# Patient Record
Sex: FEMALE | Race: WHITE | Employment: FULL TIME | URBAN - METROPOLITAN AREA
[De-identification: names, ages, dates, MRNs, and addresses within clinical notes are randomized per-mention and may not be internally consistent; named-entity substitution may affect disease eponyms.]

---

## 2017-01-20 ENCOUNTER — APPOINTMENT (OUTPATIENT)
Dept: CT IMAGING | Age: 42
End: 2017-01-20
Attending: PHYSICIAN ASSISTANT
Payer: COMMERCIAL

## 2017-01-20 ENCOUNTER — HOSPITAL ENCOUNTER (EMERGENCY)
Age: 42
Discharge: HOME | End: 2017-01-20
Attending: EMERGENCY MEDICINE
Payer: COMMERCIAL

## 2017-01-20 VITALS
WEIGHT: 166 LBS | OXYGEN SATURATION: 100 % | HEIGHT: 62 IN | TEMPERATURE: 98.1 F | HEART RATE: 78 BPM | RESPIRATION RATE: 18 BRPM | DIASTOLIC BLOOD PRESSURE: 78 MMHG | SYSTOLIC BLOOD PRESSURE: 113 MMHG | BODY MASS INDEX: 30.55 KG/M2

## 2017-01-20 DIAGNOSIS — R10.9 ABDOMINAL PAIN, RIGHT LATERAL: Primary | ICD-10-CM

## 2017-01-20 DIAGNOSIS — Z98.84 HISTORY OF BARIATRIC SURGERY: ICD-10-CM

## 2017-01-20 LAB
ALBUMIN SERPL BCP-MCNC: 4 G/DL (ref 3.4–5)
ALBUMIN/GLOB SERPL: 1 {RATIO} (ref 0.8–1.7)
ALP SERPL-CCNC: 62 U/L (ref 45–117)
ALT SERPL-CCNC: 39 U/L (ref 13–56)
ANION GAP BLD CALC-SCNC: 10 MMOL/L (ref 3–18)
APPEARANCE UR: CLEAR
AST SERPL W P-5'-P-CCNC: 20 U/L (ref 15–37)
BASOPHILS # BLD AUTO: 0 K/UL (ref 0–0.06)
BASOPHILS # BLD: 0 % (ref 0–2)
BILIRUB SERPL-MCNC: 0.4 MG/DL (ref 0.2–1)
BILIRUB UR QL: NEGATIVE
BUN SERPL-MCNC: 15 MG/DL (ref 7–18)
BUN/CREAT SERPL: 23 (ref 12–20)
CALCIUM SERPL-MCNC: 9.2 MG/DL (ref 8.5–10.1)
CHLORIDE SERPL-SCNC: 105 MMOL/L (ref 100–108)
CO2 SERPL-SCNC: 26 MMOL/L (ref 21–32)
COLOR UR: YELLOW
CREAT SERPL-MCNC: 0.64 MG/DL (ref 0.6–1.3)
DIFFERENTIAL METHOD BLD: ABNORMAL
EOSINOPHIL # BLD: 0.1 K/UL (ref 0–0.4)
EOSINOPHIL NFR BLD: 1 % (ref 0–5)
ERYTHROCYTE [DISTWIDTH] IN BLOOD BY AUTOMATED COUNT: 18.1 % (ref 11.6–14.5)
GLOBULIN SER CALC-MCNC: 4.2 G/DL (ref 2–4)
GLUCOSE SERPL-MCNC: 111 MG/DL (ref 74–99)
GLUCOSE UR STRIP.AUTO-MCNC: NEGATIVE MG/DL
HCT VFR BLD AUTO: 34.7 % (ref 35–45)
HGB BLD-MCNC: 10.5 G/DL (ref 12–16)
HGB UR QL STRIP: NEGATIVE
KETONES UR QL STRIP.AUTO: NEGATIVE MG/DL
LEUKOCYTE ESTERASE UR QL STRIP.AUTO: NEGATIVE
LIPASE SERPL-CCNC: 123 U/L (ref 73–393)
LYMPHOCYTES # BLD AUTO: 43 % (ref 21–52)
LYMPHOCYTES # BLD: 2.9 K/UL (ref 0.9–3.6)
MCH RBC QN AUTO: 20.6 PG (ref 24–34)
MCHC RBC AUTO-ENTMCNC: 30.3 G/DL (ref 31–37)
MCV RBC AUTO: 68.2 FL (ref 74–97)
MONOCYTES # BLD: 0.3 K/UL (ref 0.05–1.2)
MONOCYTES NFR BLD AUTO: 5 % (ref 3–10)
NEUTS SEG # BLD: 3.5 K/UL (ref 1.8–8)
NEUTS SEG NFR BLD AUTO: 51 % (ref 40–73)
NITRITE UR QL STRIP.AUTO: NEGATIVE
PH UR STRIP: 5 [PH] (ref 5–8)
PLATELET # BLD AUTO: 247 K/UL (ref 135–420)
PLATELET COMMENTS,PCOM: ABNORMAL
POTASSIUM SERPL-SCNC: 3.4 MMOL/L (ref 3.5–5.5)
PROT SERPL-MCNC: 8.2 G/DL (ref 6.4–8.2)
PROT UR STRIP-MCNC: NEGATIVE MG/DL
RBC # BLD AUTO: 5.09 M/UL (ref 4.2–5.3)
RBC MORPH BLD: ABNORMAL
SODIUM SERPL-SCNC: 141 MMOL/L (ref 136–145)
SP GR UR REFRACTOMETRY: 1.02 (ref 1–1.03)
UROBILINOGEN UR QL STRIP.AUTO: 0.2 EU/DL (ref 0.2–1)
WBC # BLD AUTO: 6.8 K/UL (ref 4.6–13.2)

## 2017-01-20 PROCEDURE — 99284 EMERGENCY DEPT VISIT MOD MDM: CPT

## 2017-01-20 PROCEDURE — 83690 ASSAY OF LIPASE: CPT | Performed by: PHYSICIAN ASSISTANT

## 2017-01-20 PROCEDURE — 96360 HYDRATION IV INFUSION INIT: CPT

## 2017-01-20 PROCEDURE — 80053 COMPREHEN METABOLIC PANEL: CPT | Performed by: PHYSICIAN ASSISTANT

## 2017-01-20 PROCEDURE — 74011636320 HC RX REV CODE- 636/320: Performed by: PHYSICIAN ASSISTANT

## 2017-01-20 PROCEDURE — 85025 COMPLETE CBC W/AUTO DIFF WBC: CPT | Performed by: PHYSICIAN ASSISTANT

## 2017-01-20 PROCEDURE — 74011250636 HC RX REV CODE- 250/636: Performed by: PHYSICIAN ASSISTANT

## 2017-01-20 PROCEDURE — 74176 CT ABD & PELVIS W/O CONTRAST: CPT

## 2017-01-20 PROCEDURE — 81003 URINALYSIS AUTO W/O SCOPE: CPT | Performed by: PHYSICIAN ASSISTANT

## 2017-01-20 RX ORDER — DICYCLOMINE HYDROCHLORIDE 20 MG/1
20 TABLET ORAL EVERY 6 HOURS
Qty: 20 TAB | Refills: 0 | Status: SHIPPED | OUTPATIENT
Start: 2017-01-20 | End: 2017-01-25

## 2017-01-20 RX ADMIN — IOHEXOL: 240 INJECTION, SOLUTION INTRATHECAL; INTRAVASCULAR; INTRAVENOUS; ORAL at 19:30

## 2017-01-20 RX ADMIN — SODIUM CHLORIDE 1000 ML: 900 INJECTION, SOLUTION INTRAVENOUS at 19:30

## 2017-01-20 NOTE — ED TRIAGE NOTES
Patient reports she has been having pain to right flank for about 5 months. My doctor told me it was a muscle. I went to my primary for setting up ultrasound. It is hurting I need to be seen for it they said it might be my gallbladder. Sepsis Screening completed    (  )Patient meets SIRS criteria. (  x)Patient does not meet SIRS criteria.       SIRS Criteria is achieved when two or more of the following are present   Temperature < 96.8°F (36°C) or > 100.9°F (38.3°C)   Heart Rate > 90 beats per minute   Respiratory Rate > 20 beats per minute   WBC count > 12,000 or <4,000 or > 10% bands

## 2017-01-21 NOTE — ED NOTES
Pt hourly rounding competed. Safety   Pt (x) resting on stretcher with side rails up and call bell in reach. () in chair    () in parents arms. Toileting   Pt offered ()Bedpan     (x)Assistance to Restroom     ()Urinal  Ongoing Updates  Updated on plan of care and status of test results.   Pain Management  Inquired as to comfort and offered comfort measures:    (x) warm blankets   () dimmed lights

## 2017-01-21 NOTE — DISCHARGE INSTRUCTIONS

## 2017-01-21 NOTE — ED NOTES
Patient armband removed and shredded I have reviewed discharge instructions with the patient. The patient verbalized understanding. 1 e-script reviewed.

## 2017-01-21 NOTE — ED PROVIDER NOTES
HPI Comments: 7:05 PM   Lawson Babcock is a 46 y.o. female presenting to the ED C/O RUQ abd pain that radiates into the lower abd, onset 5 months ago and worsening the last 2 weeks. Worsens with certain foods. Associated sxs include constipation. Pt has been seen for the same pain and was dx'ed with a muscle spasm. Pt is currently trying to get an US of her gallbladder through her PCP, but has not heard back. PSHX of gastric sleeve (done by Dr. Marie Gaitan). Pt denies PSHX appendectomy and cholecystectomy, fever, CP, SOB, n/v, and any other symptoms or complaints. Written by ARSH Newell, as dictated by York Sacks, PA-C     Patient is a 46 y.o. female presenting with abdominal pain. The history is provided by the patient. No  was used. Abdominal Pain    This is a new problem. The current episode started more than 1 week ago. The problem occurs constantly. The problem has been gradually worsening. The pain is located in the RUQ. Associated symptoms include constipation. Pertinent negatives include no fever, no nausea, no vomiting and no chest pain. Past Medical History:   Diagnosis Date    Anemia     Anxiety     GERD (gastroesophageal reflux disease)     Hypercholesterolemia     Hypothyroid     Intestinal malabsorption     Psoriasis     Psoriatic arthritis ([address]     Severe obesity (HCC)     Status post bariatric surgery 1/2016     sleeve resection / terracina       Past Surgical History:   Procedure Laterality Date    Hx orthopaedic       torn labrum- right shoulder    Hx gi  1/2016     sleeve resection / terracina         Family History:   Problem Relation Age of Onset    Diabetes Mother     Hypertension Mother     Elevated Lipids Father     Hypertension Father        Social History     Social History    Marital status:      Spouse name: N/A    Number of children: N/A    Years of education: N/A     Occupational History    Not on file. Social History Main Topics    Smoking status: Never Smoker    Smokeless tobacco: Never Used    Alcohol use No    Drug use: No    Sexual activity: Yes     Partners: Male     Other Topics Concern    Not on file     Social History Narrative         ALLERGIES: Bactrim [sulfamethoprim] and Vicodin [hydrocodone-acetaminophen]    Review of Systems   Constitutional: Negative for fever. Respiratory: Negative for shortness of breath. Cardiovascular: Negative for chest pain. Gastrointestinal: Positive for abdominal pain and constipation. Negative for nausea and vomiting. All other systems reviewed and are negative. Vitals:    01/20/17 1859   BP: 125/65   Pulse: 94   Resp: 16   Temp: 98.1 °F (36.7 °C)   SpO2: 100%   Weight: 75.3 kg (166 lb)   Height: 5' 2\" (1.575 m)            Physical Exam   Constitutional: She is oriented to person, place, and time. She appears well-developed and well-nourished. No distress. HENT:   Head: Normocephalic and atraumatic. Eyes: Conjunctivae and EOM are normal. Pupils are equal, round, and reactive to light. No scleral icterus. Neck: Normal range of motion. Neck supple. Cardiovascular: Normal rate and regular rhythm. Pulmonary/Chest: Effort normal and breath sounds normal. She has no wheezes. Abdominal: Soft. Bowel sounds are normal. She exhibits no distension. There is tenderness. There is no rebound and no guarding. Musculoskeletal: Normal range of motion. She exhibits no edema or tenderness. Neurological: She is alert and oriented to person, place, and time. Skin: Skin is warm and dry. Psychiatric: She has a normal mood and affect. Her behavior is normal.   Nursing note and vitals reviewed. RESULTS:    CT ABD PELV WO CONT   Final Result   1. There are no findings to explain the patient's abdominal pain.        2. 7 mm pulmonary nodule in the left lower lobe which has nonaggressive imaging  characteristics.  If the patient is a low risk patient, this can be followed in  6-12 months and again in 18 - 24 months. If this is a high-risk patient, this  should be followed in 3-6 months, 6-12 months and 18 and 24 months. As read by the radiologist.         Labs Reviewed   CBC WITH AUTOMATED DIFF - Abnormal; Notable for the following:        Result Value    HGB 10.5 (*)     HCT 34.7 (*)     MCV 68.2 (*)     MCH 20.6 (*)     MCHC 30.3 (*)     RDW 18.1 (*)     All other components within normal limits   METABOLIC PANEL, COMPREHENSIVE - Abnormal; Notable for the following:     Potassium 3.4 (*)     Glucose 111 (*)     BUN/Creatinine ratio 23 (*)     Globulin 4.2 (*)     All other components within normal limits   LIPASE   URINALYSIS W/ RFLX MICROSCOPIC       Recent Results (from the past 12 hour(s))   CBC WITH AUTOMATED DIFF    Collection Time: 01/20/17  7:30 PM   Result Value Ref Range    WBC 6.8 4.6 - 13.2 K/uL    RBC 5.09 4. 20 - 5.30 M/uL    HGB 10.5 (L) 12.0 - 16.0 g/dL    HCT 34.7 (L) 35.0 - 45.0 %    MCV 68.2 (L) 74.0 - 97.0 FL    MCH 20.6 (L) 24.0 - 34.0 PG    MCHC 30.3 (L) 31.0 - 37.0 g/dL    RDW 18.1 (H) 11.6 - 14.5 %    PLATELET 747 520 - 079 K/uL    NEUTROPHILS 51 40 - 73 %    LYMPHOCYTES 43 21 - 52 %    MONOCYTES 5 3 - 10 %    EOSINOPHILS 1 0 - 5 %    BASOPHILS 0 0 - 2 %    ABS. NEUTROPHILS 3.5 1.8 - 8.0 K/UL    ABS. LYMPHOCYTES 2.9 0.9 - 3.6 K/UL    ABS. MONOCYTES 0.3 0.05 - 1.2 K/UL    ABS. EOSINOPHILS 0.1 0.0 - 0.4 K/UL    ABS.  BASOPHILS 0.0 0.0 - 0.06 K/UL    PLATELET COMMENTS LARGE PLATELETS      RBC COMMENTS ANISOCYTOSIS  1+        RBC COMMENTS MICROCYTOSIS  1+        RBC COMMENTS HYPOCHROMIA  1+        RBC COMMENTS POIKILOCYTOSIS  1+        RBC COMMENTS TARGET CELLS  OCCASIONAL  OVALOCYTES  1+        RBC COMMENTS SCHISTOCYTES  OCCASIONAL        DF AUTOMATED     METABOLIC PANEL, COMPREHENSIVE    Collection Time: 01/20/17  7:30 PM   Result Value Ref Range    Sodium 141 136 - 145 mmol/L    Potassium 3.4 (L) 3.5 - 5.5 mmol/L    Chloride 105 100 - 108 mmol/L    CO2 26 21 - 32 mmol/L    Anion gap 10 3.0 - 18 mmol/L    Glucose 111 (H) 74 - 99 mg/dL    BUN 15 7.0 - 18 MG/DL    Creatinine 0.64 0.6 - 1.3 MG/DL    BUN/Creatinine ratio 23 (H) 12 - 20      GFR est AA >60 >60 ml/min/1.73m2    GFR est non-AA >60 >60 ml/min/1.73m2    Calcium 9.2 8.5 - 10.1 MG/DL    Bilirubin, total 0.4 0.2 - 1.0 MG/DL    ALT 39 13 - 56 U/L    AST 20 15 - 37 U/L    Alk. phosphatase 62 45 - 117 U/L    Protein, total 8.2 6.4 - 8.2 g/dL    Albumin 4.0 3.4 - 5.0 g/dL    Globulin 4.2 (H) 2.0 - 4.0 g/dL    A-G Ratio 1.0 0.8 - 1.7     LIPASE    Collection Time: 01/20/17  7:30 PM   Result Value Ref Range    Lipase 123 73 - 393 U/L   URINALYSIS W/ RFLX MICROSCOPIC    Collection Time: 01/20/17  7:30 PM   Result Value Ref Range    Color YELLOW      Appearance CLEAR      Specific gravity 1.016 1.005 - 1.030      pH (UA) 5.0 5.0 - 8.0      Protein NEGATIVE  NEG mg/dL    Glucose NEGATIVE  NEG mg/dL    Ketone NEGATIVE  NEG mg/dL    Bilirubin NEGATIVE  NEG      Blood NEGATIVE  NEG      Urobilinogen 0.2 0.2 - 1.0 EU/dL    Nitrites NEGATIVE  NEG      Leukocyte Esterase NEGATIVE  NEG          MDM  Number of Diagnoses or Management Options     Amount and/or Complexity of Data Reviewed  Clinical lab tests: ordered and reviewed  Tests in the radiology section of CPT®: ordered and reviewed (CT Abd Pelv)      ED Course     MEDICATIONS GIVEN:  Medications   sodium chloride 0.9 % bolus infusion 1,000 mL (1,000 mL IntraVENous New Bag 1/20/17 1930)   iohexol (OMNIPAQUE) ORAL mixture ( Oral Given 1/20/17 1930)        Procedures    Pulse oximetry on room air is 100%. PROGRESS NOTE:  7:05 PM  Initial assessment performed. Written by Jax Martin ED Scribe, as dictated by Andres Smith PA-C     DISCHARGE NOTE:  9:11 PM   Kimmy Lassiter  results have been reviewed with her. She has been counseled regarding her diagnosis, treatment, and plan.   She verbally conveys understanding and agreement of the signs, symptoms, diagnosis, treatment and prognosis and additionally agrees to follow up as discussed. She also agrees with the care-plan and conveys that all of her questions have been answered. I have also provided discharge instructions for her that include: educational information regarding their diagnosis and treatment, and list of reasons why they would want to return to the ED prior to their follow-up appointment, should her condition change. She has been provided with education for proper emergency department utilization. CLINICAL IMPRESSION:    1. Abdominal pain, right lateral    2. History of bariatric surgery        PLAN:  1. D/C Home  2. Current Discharge Medication List      START taking these medications    Details   dicyclomine (BENTYL) 20 mg tablet Take 1 Tab by mouth every six (6) hours for 20 doses. Qty: 20 Tab, Refills: 0         CONTINUE these medications which have NOT CHANGED    Details   FERROUS SULFATE, DRIED (IRON, DRIED, PO) Take  by mouth.      cyanocobalamin (VITAMIN B-12) 1,000 mcg sublingual tablet Take 1,000 mcg by mouth daily. multivitamin with iron (FLINTSTONES) chewable tablet Take 2 Tabs by mouth daily. levothyroxine (SYNTHROID) 150 mcg tablet Take  by mouth Daily (before breakfast). Indications: HYPOTHYROIDISM      docusate sodium (COLACE) 100 mg capsule Take 100 mg by mouth two (2) times a day.            3.   Follow-up Information     Follow up With Details Comments [address], [place] Schedule an appointment as soon as possible for a visit in 2 days  [address] [place] [id number] [phone]      [facility] EMERGENCY DEPT  As needed, If symptoms worsen [address] [facility] [address] [phone]          SCRIBE ATTESTATION:  This note is prepared by Charlene Barriga, acting as Scribe for Andres Smith PA-C.    PROVIDER ATTESTATION:  Andres Smith PA-C: The scribe's documentation has been prepared under my direction and personally reviewed by me in its entirety. I confirm that the note above accurately reflects all work, treatment, procedures, and medical decision making performed by me.

## 2017-02-01 ENCOUNTER — HOSPITAL ENCOUNTER (OUTPATIENT)
Dept: LAB | Age: 42
Discharge: HOME | End: 2017-02-01
Payer: COMMERCIAL

## 2017-02-01 ENCOUNTER — OFFICE VISIT (OUTPATIENT)
Dept: SURGERY | Age: 42
End: 2017-02-01

## 2017-02-01 VITALS
DIASTOLIC BLOOD PRESSURE: 77 MMHG | BODY MASS INDEX: 30.18 KG/M2 | HEIGHT: 62 IN | HEART RATE: 76 BPM | OXYGEN SATURATION: 100 % | WEIGHT: 164 LBS | RESPIRATION RATE: 16 BRPM | SYSTOLIC BLOOD PRESSURE: 131 MMHG

## 2017-02-01 DIAGNOSIS — R10.13 EPIGASTRIC PAIN: Primary | ICD-10-CM

## 2017-02-01 LAB
25(OH)D3 SERPL-MCNC: 38.9 NG/ML (ref 30–100)
ALBUMIN SERPL BCP-MCNC: 4 G/DL (ref 3.4–5)
ALBUMIN/GLOB SERPL: 1.2 {RATIO} (ref 0.8–1.7)
ALP SERPL-CCNC: 58 U/L (ref 45–117)
ALT SERPL-CCNC: 48 U/L (ref 13–56)
ANION GAP BLD CALC-SCNC: 10 MMOL/L (ref 3–18)
AST SERPL W P-5'-P-CCNC: 18 U/L (ref 15–37)
BASOPHILS # BLD AUTO: 0 K/UL (ref 0–0.06)
BASOPHILS # BLD: 1 % (ref 0–2)
BILIRUB SERPL-MCNC: 0.4 MG/DL (ref 0.2–1)
BUN SERPL-MCNC: 13 MG/DL (ref 7–18)
BUN/CREAT SERPL: 22 (ref 12–20)
CALCIUM SERPL-MCNC: 9.1 MG/DL (ref 8.5–10.1)
CHLORIDE SERPL-SCNC: 106 MMOL/L (ref 100–108)
CO2 SERPL-SCNC: 28 MMOL/L (ref 21–32)
CREAT SERPL-MCNC: 0.59 MG/DL (ref 0.6–1.3)
DIFFERENTIAL METHOD BLD: ABNORMAL
EOSINOPHIL # BLD: 0 K/UL (ref 0–0.4)
EOSINOPHIL NFR BLD: 1 % (ref 0–5)
ERYTHROCYTE [DISTWIDTH] IN BLOOD BY AUTOMATED COUNT: 18.1 % (ref 11.6–14.5)
FERRITIN SERPL-MCNC: 13 NG/ML (ref 8–388)
FOLATE SERPL-MCNC: >20 NG/ML (ref 3.1–17.5)
GLOBULIN SER CALC-MCNC: 3.3 G/DL (ref 2–4)
GLUCOSE SERPL-MCNC: 75 MG/DL (ref 74–99)
HCT VFR BLD AUTO: 32.1 % (ref 35–45)
HGB BLD-MCNC: 9.5 G/DL (ref 12–16)
IRON SERPL-MCNC: 15 UG/DL (ref 50–175)
LYMPHOCYTES # BLD AUTO: 39 % (ref 21–52)
LYMPHOCYTES # BLD: 1.6 K/UL (ref 0.9–3.6)
MCH RBC QN AUTO: 20.4 PG (ref 24–34)
MCHC RBC AUTO-ENTMCNC: 29.6 G/DL (ref 31–37)
MCV RBC AUTO: 68.9 FL (ref 74–97)
MONOCYTES # BLD: 0.3 K/UL (ref 0.05–1.2)
MONOCYTES NFR BLD AUTO: 7 % (ref 3–10)
NEUTS SEG # BLD: 2.3 K/UL (ref 1.8–8)
NEUTS SEG NFR BLD AUTO: 52 % (ref 40–73)
PLATELET # BLD AUTO: 215 K/UL (ref 135–420)
PLATELET COMMENTS,PCOM: ABNORMAL
POTASSIUM SERPL-SCNC: 4.1 MMOL/L (ref 3.5–5.5)
PROT SERPL-MCNC: 7.3 G/DL (ref 6.4–8.2)
RBC # BLD AUTO: 4.66 M/UL (ref 4.2–5.3)
RBC MORPH BLD: ABNORMAL
SODIUM SERPL-SCNC: 144 MMOL/L (ref 136–145)
VIT B12 SERPL-MCNC: 1900 PG/ML (ref 211–911)
WBC # BLD AUTO: 4.2 K/UL (ref 4.6–13.2)

## 2017-02-01 PROCEDURE — 83540 ASSAY OF IRON: CPT | Performed by: SPECIALIST

## 2017-02-01 PROCEDURE — 80053 COMPREHEN METABOLIC PANEL: CPT | Performed by: SPECIALIST

## 2017-02-01 PROCEDURE — 85025 COMPLETE CBC W/AUTO DIFF WBC: CPT | Performed by: SPECIALIST

## 2017-02-01 PROCEDURE — 36415 COLL VENOUS BLD VENIPUNCTURE: CPT | Performed by: SPECIALIST

## 2017-02-01 PROCEDURE — 82728 ASSAY OF FERRITIN: CPT | Performed by: SPECIALIST

## 2017-02-01 PROCEDURE — 82607 VITAMIN B-12: CPT | Performed by: SPECIALIST

## 2017-02-01 PROCEDURE — 84425 ASSAY OF VITAMIN B-1: CPT | Performed by: SPECIALIST

## 2017-02-01 PROCEDURE — 82306 VITAMIN D 25 HYDROXY: CPT | Performed by: SPECIALIST

## 2017-02-01 RX ORDER — ASPIRIN 325 MG
TABLET, DELAYED RELEASE (ENTERIC COATED) ORAL
COMMUNITY
End: 2017-02-28

## 2017-02-01 RX ORDER — CITALOPRAM 20 MG/1
40 TABLET, FILM COATED ORAL DAILY
COMMUNITY
End: 2020-07-29 | Stop reason: ALTCHOICE

## 2017-02-01 NOTE — PROGRESS NOTES
Subjective:     Dionicia Dakins  is a 46 y.o. female who presents for follow-up about 1 year following laparoscopic sleeve gastrectomy. She has lost a total of 108 pounds since surgery. Body mass index is 30 kg/(m^2). . EBWL is (74%). The patient presents today to assess their progress toward their goal of weight loss and to address any issues that may be present. Today the patient and I have reviewed their diet and how appropriate their food choices are. The following issues have been identified : RUQ pain with eating. Some nausea and emesis with the pain. .  Surgery related complication: none       She reports no issues and denies vomiting and abdominal pain. The patients diet choices have been reviewed today and are very good  Patients pain score:0    The patient's exercise level: very active. Changes in her medical history and medications have been reviewed. Patient Active Problem List   Diagnosis Code    Psoriasis L40.9    Hypothyroid E03.9    Anemia D64.9    Psoriatic arthritis ([address] L40.50    Anxiety F41.9    Intestinal malabsorption K90.9    Status post bariatric surgery Z98.84     Past Medical History   Diagnosis Date    Anemia     Anxiety     GERD (gastroesophageal reflux disease)     Hypercholesterolemia     Hypothyroid     Intestinal malabsorption     Psoriasis     Psoriatic arthritis ([address]     Severe obesity ([address]     Status post bariatric surgery 1/2016     sleeve resection / terracina     Past Surgical History   Procedure Laterality Date    Hx orthopaedic       torn labrum- right shoulder    Hx gi  1/2016     sleeve resection / terracina     Current Outpatient Prescriptions   Medication Sig Dispense Refill    LINACLOTIDE (LINZESS PO) Take  by mouth.  citalopram (CELEXA) 20 mg tablet Take  by mouth daily.  Cholecalciferol, Vitamin D3, 50,000 unit cap Take  by mouth.  FERROUS SULFATE, DRIED (IRON, DRIED, PO) Take  by mouth.       cyanocobalamin (VITAMIN B-12) 1,000 mcg sublingual tablet Take 1,000 mcg by mouth daily.  multivitamin with iron (FLINTSTONES) chewable tablet Take 2 Tabs by mouth daily.  levothyroxine (SYNTHROID) 150 mcg tablet Take  by mouth Daily (before breakfast). Indications: HYPOTHYROIDISM          Review of Symptoms:       General - No history or complaints of unexpected fever or chills  Head/Neck - No history or complaints of headache or dizziness  Cardiac - No history or complaints of chest pain, palpitations, or shortness of breath  Pulmonary - No history or complaints of shortness of breath or productive cough  Gastrointestinal - as noted above  Genitourinary - No history or complaints of hematuria/dysuria or renal lithiasis  Musculoskeletal - No history or complaints of joint  muscular weakness  Hematologic - No history of any bleeding episodes  Neurologic - No history or complaints of  migraine headaches or neurologic symptoms                     Objective:     Visit Vitals    /77 (BP 1 Location: Left arm, BP Patient Position: Sitting)    Pulse 76    Resp 16    Ht 5' 2\" (1.575 m)    Wt 74.4 kg (164 lb)    LMP 12/23/2016    SpO2 100%    BMI 30 kg/m2        Physical Exam:    General:  alert, cooperative, no distress, appears stated age   Lungs:   clear to auscultation bilaterally   Heart:  Regular rate and rhythm   Abdomen:   abdomen is soft without significant tenderness, masses, organomegaly or guarding;     Incisions: healing well, no hernias       Lab Results   Component Value Date/Time    WBC 6.8 01/20/2017 07:30 PM    HGB 10.5 01/20/2017 07:30 PM    HCT 34.7 01/20/2017 07:30 PM    PLATELET 222 63/24/9496 07:30 PM    MCV 68.2 01/20/2017 07:30 PM     Lab Results   Component Value Date/Time    Sodium 141 01/20/2017 07:30 PM    Potassium 3.4 01/20/2017 07:30 PM    Chloride 105 01/20/2017 07:30 PM    CO2 26 01/20/2017 07:30 PM    Anion gap 10 01/20/2017 07:30 PM    Glucose 111 01/20/2017 07:30 PM    BUN 15 01/20/2017 07:30 PM    Creatinine 0.64 01/20/2017 07:30 PM    BUN/Creatinine ratio 23 01/20/2017 07:30 PM    GFR est AA >60 01/20/2017 07:30 PM    GFR est non-AA >60 01/20/2017 07:30 PM    Calcium 9.2 01/20/2017 07:30 PM    Bilirubin, total 0.4 01/20/2017 07:30 PM    ALT 39 01/20/2017 07:30 PM    AST 20 01/20/2017 07:30 PM    Alk. phosphatase 62 01/20/2017 07:30 PM    Protein, total 8.2 01/20/2017 07:30 PM    Albumin 4.0 01/20/2017 07:30 PM    Globulin 4.2 01/20/2017 07:30 PM    A-G Ratio 1.0 01/20/2017 07:30 PM     Lab Results   Component Value Date/Time    Iron 17 07/20/2015 11:55 AM    Ferritin 19 07/20/2015 11:55 AM     No results found for: FOL, RBCF  No results found for: Kelly Bingham [id number]          CT ABD PELV WO CONT (Accession [id number]) (Order [id number])   Allergies      Unspecified: Bactrim [Sulfamethoprim]; Vicodin [Hydrocodone-acetaminophen]   Result Information   Status Provider Status      Final result (Exam End: 1/20/2017  8:34 PM) Open    Study Result   History: Abdominal pain.     COMPARISON: None.     TECHNIQUE: Multidetector CT images were acquired through the abdomen and pelvis  without intravenous contrast but with oral contrast. Coronal and sagittal  reformatted images as well as dose reduction techniques were employed to include  iterative reconstruction and automated exposure control.     FINDINGS:     There is a 7 mm pulmonary nodule in the left lower lobe which appears to be  mixed solid and subsolid with a pleural attachment. The right lung bases clear. The heart size is normal. The esophagus is unremarkable.     Postsurgical changes are noted in the left upper quadrant. The spleen, pancreas,  adrenal glands, kidneys and ureters do not demonstrate any acute abnormalities. The gallbladder and liver are unremarkable. Oral contrast can be seen passing  into the distal ileum but not into the terminal ileum. The small bowel is normal  in caliber.  The colon has a normal appearance. What is thought to be the  appendix is normal. The bladder and reproductive organs are unremarkable. There  is no intraperitoneal free air or free fluid. The soft tissues are unremarkable. I find no acute or destructive osseous abnormalities. Age-appropriate  degenerative changes are present in the spine.     IMPRESSION  IMPRESSION:        1. There are no findings to explain the patient's abdominal pain.        2. 7 mm pulmonary nodule in the left lower lobe which has nonaggressive imaging  characteristics. If the patient is a low risk patient, this can be followed in  6-12 months and again in 18 - 24 months. If this is a high-risk patient, this  should be followed in 3-6 months, 6-12 months and 18 and 24 months. Assessment:     1. History of Morbid obesity, status post  laparoscopic sleeve gastrectomy. Possible symptomatic cholelithiasis. Plan:     1. Remember to measure portions, continue low carbohydrate diet  2. Continue to concentrate on protein intake meeting daily requirements  3. Remember vitamin supplements. The importance of such was discussed regarding the malabsorptive issues that the surgery creates. 4. Exercise regimen appears to be: very good  5. Try and attend support group if feasible. 6. Follow-up in 6 month(s). 7. Lab ordered  8. Total time spent with the patient 30 minutes.   9. Schedule for ultrasound of abdomen

## 2017-02-01 NOTE — PATIENT INSTRUCTIONS
Body Mass Index: Care Instructions  Your Care Instructions    Body mass index (BMI) can help you see if your weight is raising your risk for health problems. It uses a formula to compare how much you weigh with how tall you are. A BMI between 18.5 and 24.9 is considered healthy. A BMI between 25 and 29.9 is considered overweight. A BMI of 30 or higher is considered obese. If your BMI is in the normal range, it means that you have a lower risk for weight-related health problems. If your BMI is in the overweight or obese range, you may be at increased risk for weight-related health problems, such as high blood pressure, heart disease, stroke, arthritis or joint pain, and diabetes. BMI is just one measure of your risk for weight-related health problems. You may be at higher risk for health problems if you are not active, you eat an unhealthy diet, or you drink too much alcohol or use tobacco products. Follow-up care is a key part of your treatment and safety. Be sure to make and go to all appointments, and call your doctor if you are having problems. It's also a good idea to know your test results and keep a list of the medicines you take. How can you care for yourself at home? · Practice healthy eating habits. This includes eating plenty of fruits, vegetables, whole grains, lean protein, and low-fat dairy. · Get at least 30 minutes of exercise 5 days a week or more. Brisk walking is a good choice. You also may want to do other activities, such as running, swimming, cycling, or playing tennis or team sports. · Do not smoke. Smoking can increase your risk for health problems. If you need help quitting, talk to your doctor about stop-smoking programs and medicines. These can increase your chances of quitting for good. · Limit alcohol to 2 drinks a day for men and 1 drink a day for women. Too much alcohol can cause health problems.   If you have a BMI higher than 25  · Your doctor may do other tests to check your risk for weight-related health problems. This may include measuring the distance around your waist. A waist measurement of more than 40 inches in men or 35 inches in women can increase the risk of weight-related health problems. · Talk with your doctor about steps you can take to stay healthy or improve your health. You may need to make lifestyle changes to lose weight and stay healthy, such as changing your diet and getting regular exercise. Where can you learn more? Go to http://christopher-vitor.info/. Enter S176 in the search box to learn more about \"Body Mass Index: Care Instructions. \"  Current as of: February 16, 2016  Content Version: 11.1  © 2212-8064 [employer]. Care instructions adapted under license by [employer] (which disclaims liability or warranty for this information). If you have questions about a medical condition or this instruction, always ask your healthcare professional. [place] [address] any warranty or liability for your use of this information. Patient Instructions      1. Continue to monitor carbohydrate and protein intake- remember to keep your           total  carbohydrates to 50 grams or less per day for best results. 2. Remember hydration goals - usually 48 to 64 ounces of liquids per day  3. Continue to work towards exercise goals - minimum 3 days per week of 45          minutes to  1 hour at a time. 4. Remember to take vitamins as directed        Supplement Resource Guide    Importance of Protein:   Maintains lean body mass, produces antibodies to fight off infections, heals wounds, minimizes hair loss, helps to give you energy, helps with satiety, and keeping you full between meals.     Importance of Calcium:  Needed for healthy bones and teeth, normal blood clotting, and nervous system functioning, higher risk of osteoporosis and bone disease with non-compliance. Importance of Multivitamins: Many functions. Supply you with extra nutrients that you may be missing from food. May lead to iron deficiency anemia, weakness, fatigue, and many other symptoms with non-compliance. Importance of B Vitamins:  Important for red blood cell formation, metabolism, energy, and helps to maintain a healthy nervous system. Protein Supplement  Find one you like now. Use immediately after surgery. Look for:  35-50g protein each day from your protein supplement once you reach the progression diet. 0-3 g fat per serving  0-3 g sugar per serving    Protein drinks should be split in separate dosages. Recommend: Lifelong  1 year + Calcium Supplement:     Start taking within a month after surgery. Look for: Calcium Citrate Plus D (1500 mg per day)  Recommend: Citracal     .          Avoid chocolate chewable calcium. Can use chewable bariatric or [employer] brand or similar chewable. The body cannot absorb more than 500-600 mg @ a time. Take for Life Multi-vitamin Supplement:      1st Month After Surgery: Any complete chewable, such as: Fargos Complete chewables. Avoid Fargo sours or gummies. They lack iron and other important nutrients and also have added sugar. Continue with chewable vitamin or change to adult complete multivitamin one month after surgery. Menstruating women can take a prenatal vitamin. Make sure has at least 18 mg iron and 482-266 mcg folic acid):    Vitamin B12, B Complex Vitamin, and Biotin  Start taking within a month after surgery. Vitamin B12:  1000 mcg of Vitamin B12 three times weekly    Must take sublingually (meaning you take it under your tongue) or in a liquid drop form for easy absorption. B Complex Vitamin: Take a pill or liquid drop form once daily. Biotin: This vitamin can help prevent hair loss.     Recommend 5mg   (5000 mcg) a day  Biotin is Optional

## 2017-02-01 NOTE — MR AVS SNAPSHOT
Visit Information Date & Time Provider Department Dept. Phone Encounter #  
 2/1/2017 10:45 AM Neris Mccollum MD [facility] [phone] [id number] Upcoming Health Maintenance Date Due DTaP/Tdap/Td series (1 - Tdap) 3/17/1998 PAP AKA CERVICAL CYTOLOGY 3/17/1998 INFLUENZA AGE 9 TO ADULT 8/1/2016 Allergies as of 2/1/2017  Review Complete On: 2/1/2017 By: Neris Mccollum MD  
  
 Severity Noted Reaction Type Reactions Bactrim [Sulfamethoprim]  06/25/2015    Rash, Other (comments) Vicodin [Hydrocodone-acetaminophen]  06/25/2015    Swelling Current Immunizations  Never Reviewed No immunizations on file. Not reviewed this visit You Were Diagnosed With   
  
 Codes Comments Epigastric pain    -  Primary ICD-10-CM: R10.13 ICD-9-CM: 789.06 Vitals BP Pulse Resp Height(growth percentile) Weight(growth percentile) LMP  
 131/77 (BP 1 Location: Left arm, BP Patient Position: Sitting) 76 16 5' 2\" (1.575 m) 164 lb (74.4 kg) 12/23/2016 SpO2 BMI OB Status Smoking Status 100% 30 kg/m2 Having regular periods Never Smoker Vitals History BMI and BSA Data Body Mass Index Body Surface Area  
 30 kg/m 2 1.8 m 2 Preferred Pharmacy Pharmacy Name Phone [facility] [id number] [address] AT [address] [phone] Your Updated Medication List  
  
   
This list is accurate as of: 2/1/17 11:28 AM.  Always use your most recent med list.  
  
  
  
  
 CeleXA 20 mg tablet Generic drug:  citalopram  
Take  by mouth daily. Cholecalciferol (Vitamin D3) 50,000 unit Cap Take  by mouth.  
  
 cyanocobalamin 1,000 mcg sublingual tablet Commonly known as:  VITAMIN B-12 Take 1,000 mcg by mouth daily. IRON (DRIED) PO Take  by mouth.  
  
 levothyroxine 150 mcg tablet Commonly known as:  SYNTHROID  
 Take  by mouth Daily (before breakfast). Indications: HYPOTHYROIDISM LINZESS PO Take  by mouth.  
  
 multivitamin with iron chewable tablet Commonly known as:  Emanuel Braden Take 2 Tabs by mouth daily. To-Do List   
 02/08/2017 Imaging:  US ABD COMP Patient Instructions Body Mass Index: Care Instructions Your Care Instructions Body mass index (BMI) can help you see if your weight is raising your risk for health problems. It uses a formula to compare how much you weigh with how tall you are. A BMI between 18.5 and 24.9 is considered healthy. A BMI between 25 and 29.9 is considered overweight. A BMI of 30 or higher is considered obese. If your BMI is in the normal range, it means that you have a lower risk for weight-related health problems. If your BMI is in the overweight or obese range, you may be at increased risk for weight-related health problems, such as high blood pressure, heart disease, stroke, arthritis or joint pain, and diabetes. BMI is just one measure of your risk for weight-related health problems. You may be at higher risk for health problems if you are not active, you eat an unhealthy diet, or you drink too much alcohol or use tobacco products. Follow-up care is a key part of your treatment and safety. Be sure to make and go to all appointments, and call your doctor if you are having problems. It's also a good idea to know your test results and keep a list of the medicines you take. How can you care for yourself at home? · Practice healthy eating habits. This includes eating plenty of fruits, vegetables, whole grains, lean protein, and low-fat dairy. · Get at least 30 minutes of exercise 5 days a week or more. Brisk walking is a good choice. You also may want to do other activities, such as running, swimming, cycling, or playing tennis or team sports. · Do not smoke. Smoking can increase your risk for health problems.  If you need help quitting, talk to your doctor about stop-smoking programs and medicines. These can increase your chances of quitting for good. · Limit alcohol to 2 drinks a day for men and 1 drink a day for women. Too much alcohol can cause health problems. If you have a BMI higher than 25 · Your doctor may do other tests to check your risk for weight-related health problems. This may include measuring the distance around your waist. A waist measurement of more than 40 inches in men or 35 inches in women can increase the risk of weight-related health problems. · Talk with your doctor about steps you can take to stay healthy or improve your health. You may need to make lifestyle changes to lose weight and stay healthy, such as changing your diet and getting regular exercise. Where can you learn more? Go to http://christopher-vitor.info/. Enter S176 in the search box to learn more about \"Body Mass Index: Care Instructions. \" Current as of: February 16, 2016 Content Version: 11.1 © 7430-4348 [employer]. Care instructions adapted under license by [employer] (which disclaims liability or warranty for this information). If you have questions about a medical condition or this instruction, always ask your healthcare professional. [place] [address] any warranty or liability for your use of this information. Introducing hospitals & HEALTH SERVICES! Ibeth Dash introduces [employer] patient portal. Now you can access parts of your medical record, email your doctor's office, and request medication refills online. 1. In your internet browser, go to [email or web address] 2. Click on the First Time User? Click Here link in the Sign In box. You will see the New Member Sign Up page. 3. Enter your [employer] Access Code exactly as it appears below. You will not need to use this code after youve completed the sign-up process.  If you do not sign up before the expiration date, you must request a new code. · Swagapalooza Access Code: [email or web address] Expires: 4/20/2017  6:51 PM 
 
4. Enter the last four digits of your Social Security Number (xxxx) and Date of Birth (mm/dd/yyyy) as indicated and click Submit. You will be taken to the next sign-up page. 5. Create a Swagapalooza ID. This will be your [employer]za login ID and cannot be changed, so think of one that is secure and easy to remember. 6. Create a [employer]ooza password. You can change your password at any time. 7. Enter your Password Reset Question and Answer. This can be used at a later time if you forget your password. 8. Enter your e-mail address. You will receive e-mail notification when new information is available in [address] 9. Click Sign Up. You can now view and download portions of your medical record. 10. Click the Download Summary menu link to download a portable copy of your medical information. If you have questions, please visit the Frequently Asked Questions section of the [employer]agapalooza website. Remember, [employer]alooza is NOT to be used for urgent needs. For medical emergencies, dial 911. Now available from your iPhone and Android! Please provide this summary of care documentation to your next provider. Your primary care clinician is listed as Esme Dejesus. If you have any questions after today's visit, please call [phone].

## 2017-02-02 ENCOUNTER — TELEPHONE (OUTPATIENT)
Dept: SURGERY | Age: 42
End: 2017-02-02

## 2017-02-02 NOTE — TELEPHONE ENCOUNTER
LMM appointment with Dr. Bartlett Notice for consultation for iron infusion on 02- @2:30, arrive 15 min. Early. I also gave her Dr. Bartlett Notice phone number should she need to reschedule.

## 2017-02-02 NOTE — TELEPHONE ENCOUNTER
----- Message from Queenie Chavez MD sent at 2/1/2017  9:51 PM EST -----  Refer to Dr Laura Crum for iron infusion.

## 2017-02-03 LAB — VIT B1 BLD-SCNC: 140.1 NMOL/L (ref 66.5–200)

## 2017-02-04 ENCOUNTER — HOSPITAL ENCOUNTER (OUTPATIENT)
Dept: ULTRASOUND IMAGING | Age: 42
Discharge: HOME | End: 2017-02-04
Attending: SPECIALIST
Payer: COMMERCIAL

## 2017-02-04 DIAGNOSIS — R10.13 EPIGASTRIC PAIN: ICD-10-CM

## 2017-02-04 PROCEDURE — 76700 US EXAM ABDOM COMPLETE: CPT

## 2017-02-06 ENCOUNTER — TELEPHONE (OUTPATIENT)
Dept: SURGERY | Age: 42
End: 2017-02-06

## 2017-02-27 DIAGNOSIS — K80.20 CALCULUS OF GALLBLADDER WITHOUT CHOLECYSTITIS WITHOUT OBSTRUCTION: ICD-10-CM

## 2017-02-27 DIAGNOSIS — R10.13 ABDOMINAL PAIN, EPIGASTRIC: Primary | ICD-10-CM

## 2017-02-27 DIAGNOSIS — Z98.84 BARIATRIC SURGERY STATUS: ICD-10-CM

## 2017-02-27 DIAGNOSIS — Z01.812 BLOOD TESTS PRIOR TO TREATMENT OR PROCEDURE: ICD-10-CM

## 2017-03-02 ENCOUNTER — HOSPITAL ENCOUNTER (OUTPATIENT)
Dept: INFUSION THERAPY | Age: 42
Discharge: HOME | End: 2017-03-02
Payer: COMMERCIAL

## 2017-03-02 VITALS
BODY MASS INDEX: 29.81 KG/M2 | DIASTOLIC BLOOD PRESSURE: 69 MMHG | RESPIRATION RATE: 18 BRPM | SYSTOLIC BLOOD PRESSURE: 101 MMHG | HEART RATE: 66 BPM | WEIGHT: 162 LBS | TEMPERATURE: 97.5 F | HEIGHT: 62 IN | OXYGEN SATURATION: 100 %

## 2017-03-02 PROCEDURE — 74011250636 HC RX REV CODE- 250/636: Performed by: INTERNAL MEDICINE

## 2017-03-02 PROCEDURE — 74011250637 HC RX REV CODE- 250/637: Performed by: INTERNAL MEDICINE

## 2017-03-02 PROCEDURE — 96365 THER/PROPH/DIAG IV INF INIT: CPT

## 2017-03-02 PROCEDURE — 96366 THER/PROPH/DIAG IV INF ADDON: CPT

## 2017-03-02 RX ORDER — SODIUM CHLORIDE 0.9 % (FLUSH) 0.9 %
10-40 SYRINGE (ML) INJECTION AS NEEDED
Status: DISCONTINUED | OUTPATIENT
Start: 2017-03-02 | End: 2017-03-06 | Stop reason: HOSPADM

## 2017-03-02 RX ORDER — ACETAMINOPHEN 325 MG/1
650 TABLET ORAL ONCE
Status: COMPLETED | OUTPATIENT
Start: 2017-03-02 | End: 2017-03-02

## 2017-03-02 RX ADMIN — ACETAMINOPHEN 650 MG: 325 TABLET ORAL at 10:46

## 2017-03-02 RX ADMIN — Medication 10 ML: at 10:45

## 2017-03-02 RX ADMIN — IRON SUCROSE 450 MG: 20 INJECTION, SOLUTION INTRAVENOUS at 10:46

## 2017-03-02 NOTE — PROGRESS NOTES
[facility] Progress Note    Date: 2017    Name: Edmund Lopez    MRN: [id number]         : 1975    Venofer Infusion    Ms. Abiola Alvarez to [facility], ambulatory, at 1000. Pt was assessed and education was provided. Ms. Erica Espinoza vitals were reviewed and patient was observed for 5 minutes prior to treatment. Visit Vitals    /69 (BP 1 Location: Left arm, BP Patient Position: Sitting)    Pulse 89    Temp 98.6 °F (37 °C)    Resp 18    Ht 5' 2\" (1.575 m)    Wt 73.5 kg (162 lb)    SpO2 100%    Breastfeeding No    BMI 29.63 kg/m2       Patient Vitals for the past 12 hrs:   Temp Pulse Resp BP SpO2   17 1455 97.5 °F (36.4 °C) 66 18 101/69 100 %   17 1251 98.8 °F (37.1 °C) 66 18 98/63 98 %   17 1115 97.9 °F (36.6 °C) 66 18 98/66 98 %   17 1019 98.6 °F (37 °C) 89 18 107/69 100 %       20 g PIV placed in Right forearm x 3 attempts by two different nurses. PIV flushed easily and had brisk blood return. Pre-medications (tylenol 650 mg) were administered as ordered and Venofer 450 mg was initiated @ 68 ml/hr. 30 minutes into infusion, VS stable and pt denied complaints of itching, lip/tongue/facial swelling, SOB, CP or other complaints. Ms. Abiola Alvarez tolerated the infusion, and had no complaints. VS remained stable. PIV flushed with NS 10 ml and removed. No bleeding or hematoma noted at site. Guaze and coban applied. Reviewed discharge instructions with patient, including expected side effects (abdominal cramping, nausea, changes in color of urine or feces) and signs of allergic reaction requiring medical attention (itching/hives/rashes, SOB, chest pain, lip/tongue/facial swelling). Patient given printed copy to take home. Patient verbalized understanding of discharge instructions. Patient armband removed and shredded. Ms. Abiola Alvarez was discharged from [address] in stable condition at 1500.  She is to follow up with Dr Yayo Nathan as needed.     Margaret Pereira RN  March 2, 2017

## 2017-03-04 ENCOUNTER — HOSPITAL ENCOUNTER (OUTPATIENT)
Dept: PREADMISSION TESTING | Age: 42
Discharge: HOME | End: 2017-03-04
Payer: COMMERCIAL

## 2017-03-04 LAB
ALBUMIN SERPL BCP-MCNC: 3.4 G/DL (ref 3.4–5)
ALBUMIN/GLOB SERPL: 0.9 {RATIO} (ref 0.8–1.7)
ALP SERPL-CCNC: 52 U/L (ref 45–117)
ALT SERPL-CCNC: 34 U/L (ref 13–56)
ANION GAP BLD CALC-SCNC: 8 MMOL/L (ref 3–18)
AST SERPL W P-5'-P-CCNC: 14 U/L (ref 15–37)
BASOPHILS # BLD AUTO: 0 K/UL (ref 0–0.06)
BASOPHILS # BLD: 0 % (ref 0–2)
BILIRUB SERPL-MCNC: 0.3 MG/DL (ref 0.2–1)
BUN SERPL-MCNC: 15 MG/DL (ref 7–18)
BUN/CREAT SERPL: 23 (ref 12–20)
CALCIUM SERPL-MCNC: 8.7 MG/DL (ref 8.5–10.1)
CHLORIDE SERPL-SCNC: 109 MMOL/L (ref 100–108)
CO2 SERPL-SCNC: 28 MMOL/L (ref 21–32)
CREAT SERPL-MCNC: 0.65 MG/DL (ref 0.6–1.3)
DIFFERENTIAL METHOD BLD: ABNORMAL
EOSINOPHIL # BLD: 0 K/UL (ref 0–0.4)
EOSINOPHIL NFR BLD: 1 % (ref 0–5)
ERYTHROCYTE [DISTWIDTH] IN BLOOD BY AUTOMATED COUNT: 18.1 % (ref 11.6–14.5)
GLOBULIN SER CALC-MCNC: 3.7 G/DL (ref 2–4)
GLUCOSE SERPL-MCNC: 70 MG/DL (ref 74–99)
HCT VFR BLD AUTO: 34 % (ref 35–45)
HGB BLD-MCNC: 10.1 G/DL (ref 12–16)
LYMPHOCYTES # BLD AUTO: 36 % (ref 21–52)
LYMPHOCYTES # BLD: 1.7 K/UL (ref 0.9–3.6)
MCH RBC QN AUTO: 21 PG (ref 24–34)
MCHC RBC AUTO-ENTMCNC: 29.7 G/DL (ref 31–37)
MCV RBC AUTO: 70.5 FL (ref 74–97)
MONOCYTES # BLD: 0.3 K/UL (ref 0.05–1.2)
MONOCYTES NFR BLD AUTO: 7 % (ref 3–10)
NEUTS SEG # BLD: 2.7 K/UL (ref 1.8–8)
NEUTS SEG NFR BLD AUTO: 56 % (ref 40–73)
PLATELET # BLD AUTO: 196 K/UL (ref 135–420)
POTASSIUM SERPL-SCNC: 3.8 MMOL/L (ref 3.5–5.5)
PROT SERPL-MCNC: 7.1 G/DL (ref 6.4–8.2)
RBC # BLD AUTO: 4.82 M/UL (ref 4.2–5.3)
RBC MORPH BLD: ABNORMAL
RBC MORPH BLD: ABNORMAL
SODIUM SERPL-SCNC: 145 MMOL/L (ref 136–145)
WBC # BLD AUTO: 4.7 K/UL (ref 4.6–13.2)

## 2017-03-04 PROCEDURE — 80053 COMPREHEN METABOLIC PANEL: CPT | Performed by: SPECIALIST

## 2017-03-04 PROCEDURE — 85025 COMPLETE CBC W/AUTO DIFF WBC: CPT | Performed by: SPECIALIST

## 2017-03-04 PROCEDURE — 36415 COLL VENOUS BLD VENIPUNCTURE: CPT | Performed by: SPECIALIST

## 2017-03-08 ENCOUNTER — ANESTHESIA EVENT (OUTPATIENT)
Dept: SURGERY | Age: 42
End: 2017-03-08
Payer: COMMERCIAL

## 2017-03-09 ENCOUNTER — ANESTHESIA (OUTPATIENT)
Dept: SURGERY | Age: 42
End: 2017-03-09
Payer: COMMERCIAL

## 2017-03-09 ENCOUNTER — APPOINTMENT (OUTPATIENT)
Dept: GENERAL RADIOLOGY | Age: 42
End: 2017-03-09
Attending: SPECIALIST
Payer: COMMERCIAL

## 2017-03-09 ENCOUNTER — SURGERY (OUTPATIENT)
Age: 42
End: 2017-03-09

## 2017-03-09 ENCOUNTER — HOSPITAL ENCOUNTER (OUTPATIENT)
Age: 42
Setting detail: OUTPATIENT SURGERY
Discharge: HOME | End: 2017-03-09
Attending: SPECIALIST | Admitting: SPECIALIST
Payer: COMMERCIAL

## 2017-03-09 VITALS
DIASTOLIC BLOOD PRESSURE: 56 MMHG | WEIGHT: 162.8 LBS | TEMPERATURE: 97.7 F | BODY MASS INDEX: 29.96 KG/M2 | SYSTOLIC BLOOD PRESSURE: 112 MMHG | OXYGEN SATURATION: 100 % | RESPIRATION RATE: 12 BRPM | HEART RATE: 64 BPM | HEIGHT: 62 IN

## 2017-03-09 LAB — HCG SERPL QL: NEGATIVE

## 2017-03-09 PROCEDURE — 74011250636 HC RX REV CODE- 250/636

## 2017-03-09 PROCEDURE — 74300 X-RAY BILE DUCTS/PANCREAS: CPT

## 2017-03-09 PROCEDURE — 77030011640 HC PAD GRND REM COVD -A: Performed by: SPECIALIST

## 2017-03-09 PROCEDURE — 77030009851 HC PCH RTVR ENDOSC AMR -B: Performed by: SPECIALIST

## 2017-03-09 PROCEDURE — 76010000138 HC OR TIME 0.5 TO 1 HR: Performed by: SPECIALIST

## 2017-03-09 PROCEDURE — 77030033271 HC TRCR ENDOSC EPATH2 J&J -B: Performed by: SPECIALIST

## 2017-03-09 PROCEDURE — 76210000021 HC REC RM PH II 0.5 TO 1 HR: Performed by: SPECIALIST

## 2017-03-09 PROCEDURE — 84703 CHORIONIC GONADOTROPIN ASSAY: CPT | Performed by: SPECIALIST

## 2017-03-09 PROCEDURE — 77030010513 HC APPL CLP LIG J&J -C: Performed by: SPECIALIST

## 2017-03-09 PROCEDURE — 77030010030: Performed by: SPECIALIST

## 2017-03-09 PROCEDURE — 77030008603 HC TRCR ENDOSC EPATH J&J -C: Performed by: SPECIALIST

## 2017-03-09 PROCEDURE — 77030032490 HC SLV COMPR SCD KNE COVD -B: Performed by: SPECIALIST

## 2017-03-09 PROCEDURE — 74011250636 HC RX REV CODE- 250/636: Performed by: SPECIALIST

## 2017-03-09 PROCEDURE — 77030008477 HC STYL SATN SLP COVD -A: Performed by: ANESTHESIOLOGY

## 2017-03-09 PROCEDURE — 76210000006 HC OR PH I REC 0.5 TO 1 HR: Performed by: SPECIALIST

## 2017-03-09 PROCEDURE — 77030004818 HC CATH CHOLGM TELE -B: Performed by: SPECIALIST

## 2017-03-09 PROCEDURE — 74011250637 HC RX REV CODE- 250/637: Performed by: ANESTHESIOLOGY

## 2017-03-09 PROCEDURE — 74011000250 HC RX REV CODE- 250: Performed by: SPECIALIST

## 2017-03-09 PROCEDURE — 77030016151 HC PROTCTR LNS DFOG COVD -B: Performed by: SPECIALIST

## 2017-03-09 PROCEDURE — 74011000250 HC RX REV CODE- 250

## 2017-03-09 PROCEDURE — 77030018836 HC SOL IRR NACL ICUM -A: Performed by: SPECIALIST

## 2017-03-09 PROCEDURE — 77030008683 HC TU ET CUF COVD -A: Performed by: NURSE ANESTHETIST, CERTIFIED REGISTERED

## 2017-03-09 PROCEDURE — 77030020782 HC GWN BAIR PAWS FLX 3M -B: Performed by: SPECIALIST

## 2017-03-09 PROCEDURE — 76060000032 HC ANESTHESIA 0.5 TO 1 HR: Performed by: SPECIALIST

## 2017-03-09 PROCEDURE — 36415 COLL VENOUS BLD VENIPUNCTURE: CPT | Performed by: SPECIALIST

## 2017-03-09 PROCEDURE — 77030003580 HC NDL INSUF VERES J&J -B: Performed by: SPECIALIST

## 2017-03-09 PROCEDURE — 88304 TISSUE EXAM BY PATHOLOGIST: CPT | Performed by: SPECIALIST

## 2017-03-09 PROCEDURE — 77030002935 HC SUT MCRYL J&J -C: Performed by: SPECIALIST

## 2017-03-09 RX ORDER — BUPIVACAINE HYDROCHLORIDE AND EPINEPHRINE 2.5; 5 MG/ML; UG/ML
INJECTION, SOLUTION EPIDURAL; INFILTRATION; INTRACAUDAL; PERINEURAL AS NEEDED
Status: DISCONTINUED | OUTPATIENT
Start: 2017-03-09 | End: 2017-03-09 | Stop reason: HOSPADM

## 2017-03-09 RX ORDER — PROPOFOL 10 MG/ML
INJECTION, EMULSION INTRAVENOUS AS NEEDED
Status: DISCONTINUED | OUTPATIENT
Start: 2017-03-09 | End: 2017-03-09 | Stop reason: HOSPADM

## 2017-03-09 RX ORDER — DEXTROSE 50 % IN WATER (D50W) INTRAVENOUS SYRINGE
25-50 AS NEEDED
Status: DISCONTINUED | OUTPATIENT
Start: 2017-03-09 | End: 2017-03-09 | Stop reason: HOSPADM

## 2017-03-09 RX ORDER — NALOXONE HYDROCHLORIDE 0.4 MG/ML
0.2 INJECTION, SOLUTION INTRAMUSCULAR; INTRAVENOUS; SUBCUTANEOUS AS NEEDED
Status: DISCONTINUED | OUTPATIENT
Start: 2017-03-09 | End: 2017-03-09 | Stop reason: HOSPADM

## 2017-03-09 RX ORDER — FENTANYL CITRATE 50 UG/ML
INJECTION, SOLUTION INTRAMUSCULAR; INTRAVENOUS AS NEEDED
Status: DISCONTINUED | OUTPATIENT
Start: 2017-03-09 | End: 2017-03-09 | Stop reason: HOSPADM

## 2017-03-09 RX ORDER — SODIUM CHLORIDE, SODIUM LACTATE, POTASSIUM CHLORIDE, CALCIUM CHLORIDE 600; 310; 30; 20 MG/100ML; MG/100ML; MG/100ML; MG/100ML
125 INJECTION, SOLUTION INTRAVENOUS CONTINUOUS
Status: DISCONTINUED | OUTPATIENT
Start: 2017-03-09 | End: 2017-03-09 | Stop reason: HOSPADM

## 2017-03-09 RX ORDER — HYDROMORPHONE HYDROCHLORIDE 1 MG/ML
0.5 INJECTION, SOLUTION INTRAMUSCULAR; INTRAVENOUS; SUBCUTANEOUS
Status: DISCONTINUED | OUTPATIENT
Start: 2017-03-09 | End: 2017-03-09 | Stop reason: HOSPADM

## 2017-03-09 RX ORDER — OXYCODONE AND ACETAMINOPHEN 5; 325 MG/1; MG/1
1 TABLET ORAL ONCE
Status: COMPLETED | OUTPATIENT
Start: 2017-03-09 | End: 2017-03-09

## 2017-03-09 RX ORDER — ALBUTEROL SULFATE 0.83 MG/ML
2.5 SOLUTION RESPIRATORY (INHALATION) AS NEEDED
Status: DISCONTINUED | OUTPATIENT
Start: 2017-03-09 | End: 2017-03-09 | Stop reason: HOSPADM

## 2017-03-09 RX ORDER — ONDANSETRON 2 MG/ML
INJECTION INTRAMUSCULAR; INTRAVENOUS AS NEEDED
Status: DISCONTINUED | OUTPATIENT
Start: 2017-03-09 | End: 2017-03-09 | Stop reason: HOSPADM

## 2017-03-09 RX ORDER — DIPHENHYDRAMINE HYDROCHLORIDE 50 MG/ML
12.5 INJECTION, SOLUTION INTRAMUSCULAR; INTRAVENOUS
Status: DISCONTINUED | OUTPATIENT
Start: 2017-03-09 | End: 2017-03-09 | Stop reason: HOSPADM

## 2017-03-09 RX ORDER — FENTANYL CITRATE 50 UG/ML
25 INJECTION, SOLUTION INTRAMUSCULAR; INTRAVENOUS AS NEEDED
Status: DISCONTINUED | OUTPATIENT
Start: 2017-03-09 | End: 2017-03-09 | Stop reason: HOSPADM

## 2017-03-09 RX ORDER — GLYCOPYRROLATE 0.2 MG/ML
INJECTION INTRAMUSCULAR; INTRAVENOUS AS NEEDED
Status: DISCONTINUED | OUTPATIENT
Start: 2017-03-09 | End: 2017-03-09 | Stop reason: HOSPADM

## 2017-03-09 RX ORDER — NEOSTIGMINE METHYLSULFATE 5 MG/5 ML
SYRINGE (ML) INTRAVENOUS AS NEEDED
Status: DISCONTINUED | OUTPATIENT
Start: 2017-03-09 | End: 2017-03-09 | Stop reason: HOSPADM

## 2017-03-09 RX ORDER — FLUMAZENIL 0.1 MG/ML
0.2 INJECTION INTRAVENOUS
Status: DISCONTINUED | OUTPATIENT
Start: 2017-03-09 | End: 2017-03-09 | Stop reason: HOSPADM

## 2017-03-09 RX ORDER — KETOROLAC TROMETHAMINE 30 MG/ML
INJECTION, SOLUTION INTRAMUSCULAR; INTRAVENOUS AS NEEDED
Status: DISCONTINUED | OUTPATIENT
Start: 2017-03-09 | End: 2017-03-09 | Stop reason: HOSPADM

## 2017-03-09 RX ORDER — HYDROMORPHONE HYDROCHLORIDE 2 MG/1
2 TABLET ORAL
Qty: 25 TAB | Refills: 0 | Status: SHIPPED | OUTPATIENT
Start: 2017-03-09 | End: 2017-08-23

## 2017-03-09 RX ORDER — LIDOCAINE HYDROCHLORIDE 20 MG/ML
INJECTION, SOLUTION EPIDURAL; INFILTRATION; INTRACAUDAL; PERINEURAL AS NEEDED
Status: DISCONTINUED | OUTPATIENT
Start: 2017-03-09 | End: 2017-03-09 | Stop reason: HOSPADM

## 2017-03-09 RX ORDER — SODIUM CHLORIDE 0.9 % (FLUSH) 0.9 %
5-10 SYRINGE (ML) INJECTION AS NEEDED
Status: DISCONTINUED | OUTPATIENT
Start: 2017-03-09 | End: 2017-03-09 | Stop reason: HOSPADM

## 2017-03-09 RX ORDER — ROCURONIUM BROMIDE 10 MG/ML
INJECTION, SOLUTION INTRAVENOUS AS NEEDED
Status: DISCONTINUED | OUTPATIENT
Start: 2017-03-09 | End: 2017-03-09 | Stop reason: HOSPADM

## 2017-03-09 RX ORDER — SODIUM CHLORIDE, SODIUM LACTATE, POTASSIUM CHLORIDE, CALCIUM CHLORIDE 600; 310; 30; 20 MG/100ML; MG/100ML; MG/100ML; MG/100ML
1000 INJECTION, SOLUTION INTRAVENOUS CONTINUOUS
Status: DISCONTINUED | OUTPATIENT
Start: 2017-03-09 | End: 2017-03-09 | Stop reason: HOSPADM

## 2017-03-09 RX ORDER — MIDAZOLAM HYDROCHLORIDE 1 MG/ML
INJECTION, SOLUTION INTRAMUSCULAR; INTRAVENOUS AS NEEDED
Status: DISCONTINUED | OUTPATIENT
Start: 2017-03-09 | End: 2017-03-09 | Stop reason: HOSPADM

## 2017-03-09 RX ORDER — MAGNESIUM SULFATE 100 %
4 CRYSTALS MISCELLANEOUS AS NEEDED
Status: DISCONTINUED | OUTPATIENT
Start: 2017-03-09 | End: 2017-03-09 | Stop reason: HOSPADM

## 2017-03-09 RX ADMIN — MIDAZOLAM HYDROCHLORIDE 2 MG: 1 INJECTION, SOLUTION INTRAMUSCULAR; INTRAVENOUS at 12:14

## 2017-03-09 RX ADMIN — GLYCOPYRROLATE 0.6 MG: 0.2 INJECTION INTRAMUSCULAR; INTRAVENOUS at 13:04

## 2017-03-09 RX ADMIN — BUPIVACAINE HYDROCHLORIDE AND EPINEPHRINE BITARTRATE 4 ML: 2.5; .005 INJECTION, SOLUTION EPIDURAL; INFILTRATION; INTRACAUDAL; PERINEURAL at 12:35

## 2017-03-09 RX ADMIN — OXYCODONE HYDROCHLORIDE AND ACETAMINOPHEN 1 TABLET: 5; 325 TABLET ORAL at 14:03

## 2017-03-09 RX ADMIN — KETOROLAC TROMETHAMINE 30 MG: 30 INJECTION, SOLUTION INTRAMUSCULAR; INTRAVENOUS at 13:04

## 2017-03-09 RX ADMIN — GLYCOPYRROLATE 0.2 MG: 0.2 INJECTION INTRAMUSCULAR; INTRAVENOUS at 12:14

## 2017-03-09 RX ADMIN — SODIUM CHLORIDE, SODIUM LACTATE, POTASSIUM CHLORIDE, AND CALCIUM CHLORIDE: 600; 310; 30; 20 INJECTION, SOLUTION INTRAVENOUS at 12:43

## 2017-03-09 RX ADMIN — PROPOFOL 200 MG: 10 INJECTION, EMULSION INTRAVENOUS at 12:19

## 2017-03-09 RX ADMIN — FENTANYL CITRATE 100 MCG: 50 INJECTION, SOLUTION INTRAMUSCULAR; INTRAVENOUS at 12:39

## 2017-03-09 RX ADMIN — Medication 3 MG: at 13:04

## 2017-03-09 RX ADMIN — ROCURONIUM BROMIDE 50 MG: 10 INJECTION, SOLUTION INTRAVENOUS at 12:19

## 2017-03-09 RX ADMIN — FENTANYL CITRATE 50 MCG: 50 INJECTION, SOLUTION INTRAMUSCULAR; INTRAVENOUS at 13:00

## 2017-03-09 RX ADMIN — FENTANYL CITRATE 100 MCG: 50 INJECTION, SOLUTION INTRAMUSCULAR; INTRAVENOUS at 12:16

## 2017-03-09 RX ADMIN — SODIUM CHLORIDE, SODIUM LACTATE, POTASSIUM CHLORIDE, AND CALCIUM CHLORIDE 125 ML/HR: 600; 310; 30; 20 INJECTION, SOLUTION INTRAVENOUS at 10:37

## 2017-03-09 RX ADMIN — ONDANSETRON 4 MG: 2 INJECTION INTRAMUSCULAR; INTRAVENOUS at 12:14

## 2017-03-09 RX ADMIN — LIDOCAINE HYDROCHLORIDE 100 MG: 20 INJECTION, SOLUTION EPIDURAL; INFILTRATION; INTRACAUDAL; PERINEURAL at 12:19

## 2017-03-09 NOTE — PERIOP NOTES
Reviewed discharge instructions with patient and mother. They verbalized understanding.  ID band removed and placed in shredder at  time of discharge

## 2017-03-09 NOTE — PERIOP NOTES
TRANSFER - OUT REPORT:    Verbal report given to Virginie Candices (name) on Onur Needle  being transferred to Phase II (unit) for routine progression of care       Report consisted of patients Situation, Background, Assessment and   Recommendations(SBAR). Information from the following report(s) SBAR, Kardex, Procedure Summary and Intake/Output was reviewed with the receiving nurse. Lines:   Peripheral IV 03/09/17 Left Hand (Active)   Site Assessment Clean, dry, & intact 3/9/2017  1:26 PM   Phlebitis Assessment 0 3/9/2017  1:26 PM   Infiltration Assessment 0 3/9/2017  1:26 PM   Dressing Status Clean, dry, & intact 3/9/2017  1:26 PM   Dressing Type Transparent;Tape 3/9/2017  1:26 PM   Hub Color/Line Status Infusing 3/9/2017  1:26 PM        Opportunity for questions and clarification was provided.       Patient transported with:   O2 @ 2 liters  Registered Nurse

## 2017-03-09 NOTE — ANESTHESIA POSTPROCEDURE EVALUATION
Post-Anesthesia Evaluation & Assessment    Visit Vitals    /55    Pulse 67    Temp 36.2 °C (97.1 °F)    Resp 19    Ht 5' 2\" (1.575 m)    Wt 73.8 kg (162 lb 12.8 oz)    SpO2 100%    BMI 29.78 kg/m2       No untreated/active PONV    Post-operative hydration adequate. Adequate post-operative analgesia per PACU discharge criteria    Mental status & level of consciousness: alert and oriented x 3    Respiratory status: patent unassisted airway     No apparent anesthetic complications requiring additional post-anesthetic care    Patient has met all discharge requirements.             Cheo Aguilera MD

## 2017-03-09 NOTE — BRIEF OP NOTE
BRIEF OPERATIVE NOTE    Date of Procedure: 3/9/2017   Preoperative Diagnosis: ABDOMINAL PAIN,GALLSTONES,PREVIOUS BARIATRIC SURGERY  Postoperative Diagnosis: ABDOMINAL PAIN,GALLSTONES,PREVIOUS BARIATRIC SURGERY    Procedure(s):  LAPAROSCOPIC CHOLECYSTECTOMY W/INTRAOPERATIVE CHOLANGIOGRAM W/IOC W/C-ARM  Surgeon(s) and Role:     * Neris Mccollum MD - Primary       Physician Assistant: JACQUELYN Sands    Surgical Staff:  Circ-1: Brandan Mills RN  Circ-Relief: Yomi Wright RN  Physician Assistant: JACQUELYN Sands  Scrub Tech-1: Pepper Jael  Scrub Tech-2: Francisco J Montana  Scrub Tech-Relief: Delmer Sierra Cushing  Event Time In   Incision Start 1235   Incision Close      Anesthesia: General   Estimated Blood Loss: none  Specimens:   ID Type Source Tests Collected by Time Destination   1 : GALLBLADDER AND CONTENTS Preservative Gallbladder  Neris Mccollum MD 3/9/2017 1255 Pathology      Findings: gallstones   Complications: none  Implants: * No implants in log *

## 2017-03-09 NOTE — H&P
Joellen Correa is a 46 y.o. female who presents for follow-up about 1 year following laparoscopic sleeve gastrectomy. She has lost a total of 108 pounds since surgery. Body mass index is 30 kg/(m^2). . EBWL is (74%). The patient presents today to assess their progress toward their goal of weight loss and to address any issues that may be present. Today the patient and I have reviewed their diet and how appropriate their food choices are. The following issues have been identified: RUQ pain with eating with some nausea and emesis with the pain and US evidence of gallstones  .   Surgery related complication: none         She reports no issues and denies vomiting and abdominal pain.     The patients diet choices have been reviewed today and are very good  Patients pain score:0     The patient's exercise level: very active.     Changes in her medical history and medications have been reviewed.          Patient Active Problem List   Diagnosis Code    Psoriasis L40.9    Hypothyroid E03.9    Anemia D64.9    Psoriatic arthritis ([address] L40.50    Anxiety F41.9    Intestinal malabsorption K90.9    Status post bariatric surgery Z98.84            Past Medical History   Diagnosis Date    Anemia      Anxiety      GERD (gastroesophageal reflux disease)      Hypercholesterolemia      Hypothyroid      Intestinal malabsorption      Psoriasis      Psoriatic arthritis ([address]      Severe obesity ([address]      Status post bariatric surgery 1/2016       sleeve resection / terracina             Past Surgical History   Procedure Laterality Date    Hx orthopaedic          torn labrum- right shoulder    Hx gi   1/2016       sleeve resection / terracina             Current Outpatient Prescriptions   Medication Sig Dispense Refill    LINACLOTIDE (LINZESS PO) Take by mouth.        citalopram (CELEXA) 20 mg tablet Take by mouth daily.        Cholecalciferol, Vitamin D3, 50,000 unit cap Take by mouth.        FERROUS SULFATE, DRIED (IRON, DRIED, PO) Take by mouth.        cyanocobalamin (VITAMIN B-12) 1,000 mcg sublingual tablet Take 1,000 mcg by mouth daily.        multivitamin with iron (FLINTSTONES) chewable tablet Take 2 Tabs by mouth daily.        levothyroxine (SYNTHROID) 150 mcg tablet Take by mouth Daily (before breakfast). Indications: HYPOTHYROIDISM             Review of Symptoms:         General - No history or complaints of unexpected fever or chills  Head/Neck - No history or complaints of headache or dizziness  Cardiac - No history or complaints of chest pain, palpitations, or shortness of breath  Pulmonary - No history or complaints of shortness of breath or productive cough  Gastrointestinal - as noted above  Genitourinary - No history or complaints of hematuria/dysuria or renal lithiasis  Musculoskeletal - No history or complaints of joint muscular weakness  Hematologic - No history of any bleeding episodes  Neurologic - No history or complaints of migraine headaches or neurologic symptoms     Objective:           Visit Vitals    /77 (BP 1 Location: Left arm, BP Patient Position: Sitting)    Pulse 76    Resp 16    Ht 5' 2\" (1.575 m)    Wt 74.4 kg (164 lb)    LMP 12/23/2016    SpO2 100%    BMI 30 kg/m2         Physical Exam:     General:  alert, cooperative, no distress, appears stated age   Lungs:  clear to auscultation bilaterally   Heart:  Regular rate and rhythm   Abdomen:  abdomen is soft without significant tenderness, masses, organomegaly or guarding;     Incisions: healing well, no hernias             Lab Results   Component Value Date/Time     WBC 6.8 01/20/2017 07:30 PM     HGB 10.5 01/20/2017 07:30 PM     HCT 34.7 01/20/2017 07:30 PM     PLATELET 092 94/62/8618 07:30 PM     MCV 68.2 01/20/2017 07:30 PM            Lab Results   Component Value Date/Time     Sodium 141 01/20/2017 07:30 PM     Potassium 3.4 01/20/2017 07:30 PM     Chloride 105 01/20/2017 07:30 PM     CO2 26 01/20/2017 07:30 PM     Anion gap 10 01/20/2017 07:30 PM     Glucose 111 01/20/2017 07:30 PM     BUN 15 01/20/2017 07:30 PM     Creatinine 0.64 01/20/2017 07:30 PM     BUN/Creatinine ratio 23 01/20/2017 07:30 PM     GFR est AA >60 01/20/2017 07:30 PM     GFR est non-AA >60 01/20/2017 07:30 PM     Calcium 9.2 01/20/2017 07:30 PM     Bilirubin, total 0.4 01/20/2017 07:30 PM     ALT 39 01/20/2017 07:30 PM     AST 20 01/20/2017 07:30 PM     Alk. phosphatase 62 01/20/2017 07:30 PM     Protein, total 8.2 01/20/2017 07:30 PM     Albumin 4.0 01/20/2017 07:30 PM     Globulin 4.2 01/20/2017 07:30 PM     A-G Ratio 1.0 01/20/2017 07:30 PM            Lab Results   Component Value Date/Time     Iron 17 07/20/2015 11:55 AM     Ferritin 19 07/20/2015 11:55 AM      No results found for: FOL, RBCF  No results found for: [place], VD3RIA             CT ABD PELV WO CONT (Accession [id number]) (Order [id number])   Allergies    Unspecified: Bactrim [Sulfamethoprim]; Vicodin [Hydrocodone-acetaminophen]   Result Information   Status Provider Status      Final result (Exam End: 1/20/2017  8:34 PM) Open     Study Result   History: Abdominal pain.      COMPARISON: None.      TECHNIQUE: Multidetector CT images were acquired through the abdomen and pelvis  without intravenous contrast but with oral contrast. Coronal and sagittal  reformatted images as well as dose reduction techniques were employed to include  iterative reconstruction and automated exposure control.      FINDINGS:      There is a 7 mm pulmonary nodule in the left lower lobe which appears to be  mixed solid and subsolid with a pleural attachment. The right lung bases clear. The heart size is normal. The esophagus is unremarkable.      Postsurgical changes are noted in the left upper quadrant. The spleen, pancreas,  adrenal glands, kidneys and ureters do not demonstrate any acute abnormalities. The gallbladder and liver are unremarkable.  Oral contrast can be seen passing  into the distal ileum but not into the terminal ileum. The small bowel is normal  in caliber. The colon has a normal appearance. What is thought to be the  appendix is normal. The bladder and reproductive organs are unremarkable. There  is no intraperitoneal free air or free fluid. The soft tissues are unremarkable. I find no acute or destructive osseous abnormalities. Age-appropriate  degenerative changes are present in the spine.      IMPRESSION  IMPRESSION:          1. There are no findings to explain the patient's abdominal pain.          2. 7 mm pulmonary nodule in the left lower lobe which has nonaggressive imaging  characteristics. If the patient is a low risk patient, this can be followed in  6-12 months and again in 18 - 24 months. If this is a high-risk patient, this  should be followed in 3-6 months, 6-12 months and 18 and 24 months.         Assessment:      1. History of Morbid obesity, status post laparoscopic sleeve gastrectomy. Patient with pain consistent with US findings of gallstones     Plan:      1. Lap sujatha with IOC. She understands the risks, benefits and alternatives of the procedure. She understands the risks include but are not limited to; death, DVT/PE, bleeding, infection, and damage to surrounding structures. She agrees to this and wishes to proceed.

## 2017-03-09 NOTE — OP NOTES
[address] [place] [address]  OPERATIVE REPORT    Name:  Dario Lin  MR#:  [id number]  :  1975  Account #:  [de-identified]  Date of Adm:  2017  Date of Surgery:  2017      PREOPERATIVE DIAGNOSIS: Symptomatic cholelithiasis. POSTOPERATIVE DIAGNOSIS: Symptomatic cholelithiasis. PROCEDURES PERFORMED: Laparoscopic cholecystectomy with  intraoperative cholangiogram.    SURGEON: Corrie Manzo MD    ASSISTANT: JACQUELYN Grewal    ANESTHESIA: General endotracheal.    COMPLICATIONS: None. ESTIMATED BLOOD LOSS: None. SPECIMENS REMOVED: Gallbladder specimen. STATEMENT OF MEDICAL NECESSITY: The patient is a patient of  mine who had a sleeve gastrectomy in the past, who has now lost 120  pounds since her surgery and has recently developed some abdominal  pain. An ultrasound of the abdomen was obtained. It did show  cholelithiasis. She has had referring episodes of pain and I did  recommend that she undergo cholecystectomy. She has agreed at this  time. DESCRIPTION OF PROCEDURE: The patient was brought to the  operating room, placed on the table in supine position. Some general  anesthesia was administered without any difficulty. Her abdomen was  then prepped and draped in the usual sterile fashion. Using a 15 blade,  a 1 cm incision was made inferior to the umbilicus. A Veress needle  approach was used to gain access to the peritoneal cavity, which was  then insufflated. The Visi-Port was then placed at that site, and then 3  additional trocars placed in the usual right subcostal fashion. On  entering the abdomen, the patient's gallbladder was noted to be fairly  normal in appearance with only mild chronic inflammation present. It  was retracted over the liver margin and I began to dissect the  infundibular region of the gallbladder. The duodenum was stuck to this  area. I dissected this off. I then noted the cystic duct was completely  curled up and was not straight.  I had to free up the adhesions from  around the cystic duct, such that I could finally straighten it out and  create a window anteriorly and posteriorly along the cystic duct. I then  placed a clip along the gallbladder cystic duct junction. I then created a  tiny ductotomy and used only dissection scissors. I then secured a  cholangiocatheter in place with intraoperative 2 cholangiograms which  showed good filling of the cystic duct, the common duct with no  evidence of any choledocholithiasis noted. At this time, the cystic duct  catheter was then removed. The cystic duct was then doubly clipped  and divided. The cystic artery was likewise located. It was then doubly  clipped and divided, and the gallbladder itself was then taken off the  liver bed using electrocautery. It was then placed in an Endobag and  brought out via the infraumbilical incision. When this was all achieved, I  then checked all areas for hemostasis and noted to be completely  intact. I then removed all trocars, closing each one using 4-0  subcuticular Monocryl. Steri-Strips and sterile dressings were applied. The patient tolerated the procedure well.         Sonny Gaviria MD    AT / TW  D:  03/09/2017   14:56  T:  03/09/2017   16:47  Job #:  [id number]

## 2017-03-09 NOTE — DISCHARGE INSTRUCTIONS
Cholecystectomy: What to Expect at [address] [place] [address]  After your surgery, it is normal to feel weak and tired for several days after you return home. Your belly may be swollen. If you had laparoscopic surgery, you may also have pain in your shoulder for about 24 hours. You may have gas or need to burp a lot at first, and a few people get diarrhea. The diarrhea usually goes away in 2 to 4 weeks, but it may last longer. How quickly you recover depends on whether you had a laparoscopic or open surgery. · For a laparoscopic surgery, most people can go back to work or their normal routine in 1 to 2 weeks, but it may take longer, depending on the type of work you do. · For an open surgery, it will probably take 4 to 6 weeks before you get back to your normal routine. This care sheet gives you a general idea about how long it will take for you to recover. However, each person recovers at a different pace. Follow the steps below to get better as quickly as possible. How can you care for yourself at home? Activity  · Rest when you feel tired. Getting enough sleep will help you recover. · Try to walk each day. Start out by walking a little more than you did the day before. Gradually increase the amount you walk. Walking boosts blood flow and helps prevent pneumonia and constipation. · For about 2 to 4 weeks, avoid lifting anything that would make you strain. This may include a child, heavy grocery bags and milk containers, a heavy briefcase or backpack, cat litter or dog food bags, or a vacuum . · Avoid strenuous activities, such as biking, jogging, weightlifting, and aerobic exercise, until your doctor says it is okay. · You may shower 24 to 48 hours after surgery, if your doctor okays it. Pat the cut (incision) dry. Do not take a bath for the first 2 weeks, or until your doctor tells you it is okay.   · You may drive when you are no longer taking pain medicine and can quickly move your foot from the gas pedal to the brake. You must also be able to sit comfortably for a long period of time, even if you do not plan to go far. You might get caught in traffic. · For a laparoscopic surgery, most people can go back to work or their normal routine in 1 to 2 weeks, but it may take longer. For an open surgery, it will probably take 4 to 6 weeks before you get back to your normal routine. · Your doctor will tell you when you can have sex again. Diet Follow your pre op diet advance as tolerated  Medicines  · Your doctor will tell you if and when you can restart your medicines. He or she will also give you instructions about taking any new medicines. · If you take blood thinners, such as warfarin (Coumadin), clopidogrel (Plavix), or aspirin, be sure to talk to your doctor. He or she will tell you if and when to start taking those medicines again. Make sure that you understand exactly what your doctor wants you to do. · Take pain medicines exactly as directed. ¨ If the doctor gave you a prescription medicine for pain, take it as prescribed. ¨ If you are not taking a prescription pain medicine, take an over-the-counter medicine such as acetaminophen (Tylenol), ibuprofen (Advil, Motrin), or naproxen (Aleve). Read and follow all instructions on the label. ¨ Do not take two or more pain medicines at the same time unless the doctor told you to. Many pain medicines contain acetaminophen, which is Tylenol. Too much Tylenol can be harmful. · If you think your pain medicine is making you sick to your stomach:  ¨ Take your medicine after meals (unless your doctor tells you not to). ¨ Ask your doctor for a different pain medicine. · If your doctor prescribed antibiotics, take them as directed. Do not stop taking them just because you feel better. You need to take the full course of antibiotics. Incision care  · If you have strips of tape on the incision, or cut, leave the tape on for a week or until it falls off.   · After 24 to 48 hours, wash the area daily with warm, soapy water, and pat it dry. · You may have staples to hold the cut together. Keep them dry until your doctor takes them out. This is usually in 7 to 10 days. · Keep the area clean and dry. You may cover it with a gauze bandage if it weeps or rubs against clothing. Change the bandage every day. Ice  · To reduce swelling and pain, put ice or a cold pack on your belly for 10 to 20 minutes at a time. Do this every 1 to 2 hours. Put a thin cloth between the ice and your skin. Follow-up care is a key part of your treatment and safety. Be sure to make and go to all appointments, and call your doctor if you are having problems. It's also a good idea to know your test results and keep a list of the medicines you take. When should you call for help? Call 911 anytime you think you may need emergency care. For example, call if:  · You passed out (lost consciousness). · You have severe trouble breathing. · You have sudden chest pain and shortness of breath, or you cough up blood. Call your doctor now or seek immediate medical care if:  · You are sick to your stomach and cannot drink fluids. · You have pain that does not get better when you take your pain medicine. · You have signs of infection, such as:  ¨ Increased pain, swelling, warmth, or redness. ¨ Red streaks leading from the incision. ¨ Pus draining from the incision. ¨ Swollen lymph nodes in your neck, armpits, or groin. ¨ A fever. · Your urine turns dark brown or your stool is light-colored or jocelyn-colored. · Your skin or the whites of your eyes turn yellow. · Bright red blood has soaked through a large bandage over your incision. · You have signs of a blood clot, such as:  ¨ Pain in your calf, back of knee, thigh, or groin. ¨ Redness and swelling in your leg or groin. · You have trouble passing urine or stool, especially if you have mild pain or swelling in your lower belly.   Watch closely for any changes in your health, and be sure to contact your doctor if:  · You had a laparoscopic surgery and your shoulder pain lasts more than 24 hours. · You do not have a bowel movement after taking a laxative. Where can you learn more? Go to http://christopher-vitor.info/. Enter 326 43 [phone] in the search box to learn more about \"Cholecystectomy: What to Expect at Home. \"  Current as of: August 9, 2016  Content Version: 11.1  © 9425-1462 [employer]. Care instructions adapted under license by [employer] (which disclaims liability or warranty for this information). If you have questions about a medical condition or this instruction, always ask your healthcare professional. [place] [address] any warranty or liability for your use of this information. DISCHARGE SUMMARY from Nurse    The following personal items are in your possession at time of discharge:    Dental Appliances: None  Visual Aid: Glasses, With patient     Home Medications: None  Jewelry: Body Piercing, With patient (Plastic nose ring)  Clothing: Pants, Shirt, Undergarments, Footwear, Socks (with Banadr Friend)  Other Valuables: Wallet, Eyeglasses, Cell Phone (with Bandar Friend)             PATIENT INSTRUCTIONS:    After general anesthesia or intravenous sedation, for 24 hours or while taking prescription Narcotics:  · Limit your activities  · Do not drive and operate hazardous machinery  · Do not make important personal or business decisions  · Do  not drink alcoholic beverages  · If you have not urinated within 8 hours after discharge, please contact your surgeon on call.     Report the following to your surgeon:  · Excessive pain, swelling, redness or odor of or around the surgical area  · Temperature over 100.5  · Nausea and vomiting lasting longer than 4 hours or if unable to take medications  · Any signs of decreased circulation or nerve impairment to extremity: change in color, persistent  numbness, tingling, coldness or increase pain  · Any questions        What to do at Home:  Recommended activity: Activity as tolerated and no driving for today, no driving if taking pain meds        *  Please give a list of your current medications to your Primary Care Provider. *  Please update this list whenever your medications are discontinued, doses are      changed, or new medications (including over-the-counter products) are added. *  Please carry medication information at all times in case of emergency situations. These are general instructions for a healthy lifestyle:    No smoking/ No tobacco products/ Avoid exposure to second hand smoke    Surgeon General's Warning:  Quitting smoking now greatly reduces serious risk to your health. Obesity, smoking, and sedentary lifestyle greatly increases your risk for illness    A healthy diet, regular physical exercise & weight monitoring are important for maintaining a healthy lifestyle    You may be retaining fluid if you have a history of heart failure or if you experience any of the following symptoms:  Weight gain of 3 pounds or more overnight or 5 pounds in a week, increased swelling in our hands or feet or shortness of breath while lying flat in bed. Please call your doctor as soon as you notice any of these symptoms; do not wait until your next office visit. Recognize signs and symptoms of STROKE:    F-face looks uneven    A-arms unable to move or move unevenly    S-speech slurred or non-existent    T-time-call 911 as soon as signs and symptoms begin-DO NOT go       Back to bed or wait to see if you get better-TIME IS BRAIN. Warning Signs of HEART ATTACK     Call 911 if you have these symptoms:   Chest discomfort. Most heart attacks involve discomfort in the center of the chest that lasts more than a few minutes, or that goes away and comes back. It can feel like uncomfortable pressure, squeezing, fullness, or pain.    Discomfort in other areas of the upper body. Symptoms can include pain or discomfort in one or both arms, the back, neck, jaw, or stomach.  Shortness of breath with or without chest discomfort.  Other signs may include breaking out in a cold sweat, nausea, or lightheadedness. Don't wait more than five minutes to call 911 - MINUTES MATTER! Fast action can save your life. Calling 911 is almost always the fastest way to get lifesaving treatment. Emergency Medical Services staff can begin treatment when they arrive -- up to an hour sooner than if someone gets to the hospital by car. The discharge information has been reviewed with the patient and caregiver. The patient and caregiver verbalized understanding. Discharge medications reviewed with the patient and caregiver and appropriate educational materials and side effects teaching were provided.

## 2017-03-09 NOTE — INTERVAL H&P NOTE
H&P Update:  Dionicia Dakins was seen and examined. History and physical has been reviewed. The patient has been examined.  There have been no significant clinical changes since the completion of the originally dated History and Physical.    Signed By: Lani Tang MD     March 9, 2017 11:56 AM

## 2017-03-09 NOTE — ANESTHESIA PREPROCEDURE EVALUATION
Anesthetic History   No history of anesthetic complications            Review of Systems / Medical History  Patient summary reviewed, nursing notes reviewed and pertinent labs reviewed    Pulmonary  Within defined limits                 Neuro/Psych   Within defined limits           Cardiovascular  Within defined limits                Exercise tolerance: >4 METS     GI/Hepatic/Renal     GERD: well controlled           Endo/Other      Hypothyroidism: well controlled       Other Findings              Physical Exam    Airway  Mallampati: II  TM Distance: 4 - 6 cm  Neck ROM: normal range of motion   Mouth opening: Normal     Cardiovascular  Regular rate and rhythm,  S1 and S2 normal,  no murmur, click, rub, or gallop             Dental  No notable dental hx       Pulmonary  Breath sounds clear to auscultation               Abdominal  GI exam deferred       Other Findings            Anesthetic Plan    ASA: 2  Anesthesia type: general          Induction: Intravenous  Anesthetic plan and risks discussed with: Patient

## 2017-03-09 NOTE — PERIOP NOTES
Patient recalls no allergic reaction with oxycodone/Percocet, order to give on table per DR Sasha Palafox.

## 2017-03-09 NOTE — IP AVS SNAPSHOT
[address] [address] Patient: Hillary Matos MRN: [id number] You are allergic to the following Allergen Reactions Other Food Other (comments) Bananas and soy per allergy testing Bactrim (Sulfamethoprim) Rash Other (comments) Peanut Other (comments) Per allergy testing Vicodin (Hydrocodone-Acetaminophen) Swelling Recent Documentation Height Weight BMI OB Status Smoking Status 1.575 m 73.8 kg 29.78 kg/m2 Having regular periods Never Smoker Emergency Contacts Name Discharge Info Relation Home Work Mobile Gladys Tipton DISCHARGE CAREGIVER [3] Mother [14]   [phone] Shannon Stallings  Spouse [3] [phone] About your hospitalization You were admitted on:  March 9, 2017 You last received care in the:  [facility] PHASE 2 RECOVERY You were discharged on:  March 9, 2017 Unit phone number:    
  
Why you were hospitalized Your primary diagnosis was:  Not on File Providers Seen During Your Hospitalizations Provider Role Specialty Primary office phone Almedia Gosselin, MD Attending Provider General Surgery [phone] Your Primary Care Physician (PCP) Primary Care Physician Office Phone Office Fax Breana Saleh [phone] Follow-up Information Follow up With Details Comments Contact Info Raheel Canales [address] [place] [facility] 
[phone] Your Appointments Monday March 27, 2017  4:00 PM EDT  
POST OP with Almedia Gosselin, MD [facility] Tabitha Lesch ([facility])  
 [address] [place] [address]  
[phone] Current Discharge Medication List  
  
START taking these medications Dose & Instructions Dispensing Information Comments Morning Noon Evening Bedtime HYDROmorphone 2 mg tablet Commonly known as:  DILAUDID Your next dose is: Today, Tomorrow Other:  _________ Dose:  2 mg Take 1 Tab by mouth every four (4) hours as needed for Pain. Max Daily Amount: 12 mg. Quantity:  25 Tab Refills:  0 CONTINUE these medications which have NOT CHANGED Dose & Instructions Dispensing Information Comments Morning Noon Evening Bedtime CeleXA 20 mg tablet Generic drug:  citalopram  
   
Your next dose is: Today, Tomorrow Other:  _________ Take  by mouth daily. Refills:  0  
     
   
   
   
  
 cyanocobalamin 1,000 mcg sublingual tablet Commonly known as:  VITAMIN B-12 Your next dose is: Today, Tomorrow Other:  _________ Dose:  1000 mcg Take 1,000 mcg by mouth daily. Refills:  0 IRON (DRIED) PO Your next dose is: Today, Tomorrow Other:  _________ Dose:  325 mg Take 325 mg by mouth two (2) times a day. Refills:  0 LINZESS PO Your next dose is: Today, Tomorrow Other:  _________ Dose:  145 mcg Take 145 mcg by mouth daily. Refills:  0  
     
   
   
   
  
 multivitamin with iron chewable tablet Commonly known as:  Sol Priestly Your next dose is: Today, Tomorrow Other:  _________ Dose:  2 Tab Take 2 Tabs by mouth daily. Refills:  0  
     
   
   
   
  
 SYNTHROID 112 mcg tablet Generic drug:  levothyroxine Your next dose is: Today, Tomorrow Other:  _________ Dose:  112 mcg Take 112 mcg by mouth Daily (before breakfast). Refills:  0  
     
   
   
   
  
 VITAMIN D2 50,000 unit capsule Generic drug:  ergocalciferol Your next dose is: Today, Tomorrow Other:  _________ Dose:  31358 Units Take 50,000 Units by mouth every seven (7) days. Refills:  0 STOP taking these medications HYDROcodone-acetaminophen 5-325 mg per tablet Commonly known as:  Ravindra King Where to Get Your Medications Information on where to get these meds will be given to you by the nurse or doctor. ! Ask your nurse or doctor about these medications HYDROmorphone 2 mg tablet Discharge Instructions Cholecystectomy: What to Expect at [facility] Your Recovery After your surgery, it is normal to feel weak and tired for several days after you return home. Your belly may be swollen. If you had laparoscopic surgery, you may also have pain in your shoulder for about 24 hours. You may have gas or need to burp a lot at first, and a few people get diarrhea. The diarrhea usually goes away in 2 to 4 weeks, but it may last longer. How quickly you recover depends on whether you had a laparoscopic or open surgery. · For a laparoscopic surgery, most people can go back to work or their normal routine in 1 to 2 weeks, but it may take longer, depending on the type of work you do. · For an open surgery, it will probably take 4 to 6 weeks before you get back to your normal routine. This care sheet gives you a general idea about how long it will take for you to recover. However, each person recovers at a different pace. Follow the steps below to get better as quickly as possible. How can you care for yourself at home? Activity · Rest when you feel tired. Getting enough sleep will help you recover. · Try to walk each day. Start out by walking a little more than you did the day before. Gradually increase the amount you walk. Walking boosts blood flow and helps prevent pneumonia and constipation. · For about 2 to 4 weeks, avoid lifting anything that would make you strain. This may include a child, heavy grocery bags and milk containers, a heavy briefcase or backpack, cat litter or dog food bags, or a vacuum . · Avoid strenuous activities, such as biking, jogging, weightlifting, and aerobic exercise, until your doctor says it is okay. · You may shower 24 to 48 hours after surgery, if your doctor okays it. Pat the cut (incision) dry. Do not take a bath for the first 2 weeks, or until your doctor tells you it is okay. · You may drive when you are no longer taking pain medicine and can quickly move your foot from the gas pedal to the brake. You must also be able to sit comfortably for a long period of time, even if you do not plan to go far. You might get caught in traffic. · For a laparoscopic surgery, most people can go back to work or their normal routine in 1 to 2 weeks, but it may take longer. For an open surgery, it will probably take 4 to 6 weeks before you get back to your normal routine. · Your doctor will tell you when you can have sex again. Diet Follow your pre op diet advance as tolerated Medicines · Your doctor will tell you if and when you can restart your medicines. He or she will also give you instructions about taking any new medicines. · If you take blood thinners, such as warfarin (Coumadin), clopidogrel (Plavix), or aspirin, be sure to talk to your doctor. He or she will tell you if and when to start taking those medicines again. Make sure that you understand exactly what your doctor wants you to do. · Take pain medicines exactly as directed. ¨ If the doctor gave you a prescription medicine for pain, take it as prescribed. ¨ If you are not taking a prescription pain medicine, take an over-the-counter medicine such as acetaminophen (Tylenol), ibuprofen (Advil, Motrin), or naproxen (Aleve). Read and follow all instructions on the label. ¨ Do not take two or more pain medicines at the same time unless the doctor told you to. Many pain medicines contain acetaminophen, which is Tylenol. Too much Tylenol can be harmful.  
· If you think your pain medicine is making you sick to your stomach: 
 ¨ Take your medicine after meals (unless your doctor tells you not to). ¨ Ask your doctor for a different pain medicine. · If your doctor prescribed antibiotics, take them as directed. Do not stop taking them just because you feel better. You need to take the full course of antibiotics. Incision care · If you have strips of tape on the incision, or cut, leave the tape on for a week or until it falls off. · After 24 to 48 hours, wash the area daily with warm, soapy water, and pat it dry. · You may have staples to hold the cut together. Keep them dry until your doctor takes them out. This is usually in 7 to 10 days. · Keep the area clean and dry. You may cover it with a gauze bandage if it weeps or rubs against clothing. Change the bandage every day. Ice · To reduce swelling and pain, put ice or a cold pack on your belly for 10 to 20 minutes at a time. Do this every 1 to 2 hours. Put a thin cloth between the ice and your skin. Follow-up care is a key part of your treatment and safety. Be sure to make and go to all appointments, and call your doctor if you are having problems. It's also a good idea to know your test results and keep a list of the medicines you take. When should you call for help? Call 911 anytime you think you may need emergency care. For example, call if: 
· You passed out (lost consciousness). · You have severe trouble breathing. · You have sudden chest pain and shortness of breath, or you cough up blood. Call your doctor now or seek immediate medical care if: 
· You are sick to your stomach and cannot drink fluids. · You have pain that does not get better when you take your pain medicine. · You have signs of infection, such as: 
¨ Increased pain, swelling, warmth, or redness. ¨ Red streaks leading from the incision. ¨ Pus draining from the incision. ¨ Swollen lymph nodes in your neck, armpits, or groin. ¨ A fever.  
· Your urine turns dark brown or your stool is light-colored or jocelyn-colored. · Your skin or the whites of your eyes turn yellow. · Bright red blood has soaked through a large bandage over your incision. · You have signs of a blood clot, such as: 
¨ Pain in your calf, back of knee, thigh, or groin. ¨ Redness and swelling in your leg or groin. · You have trouble passing urine or stool, especially if you have mild pain or swelling in your lower belly. Watch closely for any changes in your health, and be sure to contact your doctor if: 
· You had a laparoscopic surgery and your shoulder pain lasts more than 24 hours. · You do not have a bowel movement after taking a laxative. Where can you learn more? Go to http://christopher-vitor.info/. Enter 209 12 [id number] in the search box to learn more about \"Cholecystectomy: What to Expect at Home. \" Current as of: August 9, 2016 Content Version: 11.1 © 2461-2427 [employer]. Care instructions adapted under license by [employer] (which disclaims liability or warranty for this information). If you have questions about a medical condition or this instruction, always ask your healthcare professional. [place] [address] any warranty or liability for your use of this information. DISCHARGE SUMMARY from Nurse The following personal items are in your possession at time of discharge: 
 
Dental Appliances: None Visual Aid: Glasses, With patient Home Medications: None Jewelry: Body Piercing, With patient (Plastic nose ring) Clothing: Pants, Shirt, Undergarments, Footwear, Socks (with Homero Doe) Other Valuables: Berneta Flax, Cell Phone (with Homero Doe) PATIENT INSTRUCTIONS: 
 
 
F-face looks uneven A-arms unable to move or move unevenly S-speech slurred or non-existent T-time-call 911 as soon as signs and symptoms begin-DO NOT go Back to bed or wait to see if you get better-TIME IS BRAIN. Warning Signs of HEART ATTACK Call 911 if you have these symptoms: 
? Chest discomfort. Most heart attacks involve discomfort in the center of the chest that lasts more than a few minutes, or that goes away and comes back. It can feel like uncomfortable pressure, squeezing, fullness, or pain. ? Discomfort in other areas of the upper body. Symptoms can include pain or discomfort in one or both arms, the back, neck, jaw, or stomach. ? Shortness of breath with or without chest discomfort. ? Other signs may include breaking out in a cold sweat, nausea, or lightheadedness. Don't wait more than five minutes to call [address]! Fast action can save your life. Calling 911 is almost always the fastest way to get lifesaving treatment. Emergency Medical Services staff can begin treatment when they arrive  up to an hour sooner than if someone gets to the hospital by car. The discharge information has been reviewed with the patient and caregiver. The patient and caregiver verbalized understanding. Discharge medications reviewed with the patient and caregiver and appropriate educational materials and side effects teaching were provided. Discharge Orders None Introducing [facility]! Sherron Youssef introduces [employer] patient portal. Now you can access parts of your medical record, email your doctor's office, and request medication refills online. 1. In your internet browser, go to [email or web address] 2. Click on the First Time User? Click Here link in the Sign In box. You will see the New Member Sign Up page. 3. Enter your [employer] Access Code exactly as it appears below. You will not need to use this code after youve completed the sign-up process.  If you do not sign up before the expiration date, you must request a new code. 
 
· [employer] Access Code: [email or web address] Expires: 4/20/2017  6:51 PM 
 
4. Enter the last four digits of your Social Security Number (xxxx) and Date of Birth (mm/dd/yyyy) as indicated and click Submit. You will be taken to the next sign-up page. 5. Create a [employer] ID. This will be your [employer] login ID and cannot be changed, so think of one that is secure and easy to remember. 6. Create a [employer] password. You can change your password at any time. 7. Enter your Password Reset Question and Answer. This can be used at a later time if you forget your password. 8. Enter your e-mail address. You will receive e-mail notification when new information is available in [address] 9. Click Sign Up. You can now view and download portions of your medical record. 10. Click the Download Summary menu link to download a portable copy of your medical information. If you have questions, please visit the Frequently Asked Questions section of the [employer] website. Remember, [employer] is NOT to be used for urgent needs. For medical emergencies, dial 911. Now available from your iPhone and Android! General Information Please provide this summary of care documentation to your next provider. Patient Signature:  ____________________________________________________________ Date:  ____________________________________________________________  
  
Janyth [facility] Provider Signature:  ____________________________________________________________ Date:  ____________________________________________________________

## 2017-04-05 ENCOUNTER — OFFICE VISIT (OUTPATIENT)
Dept: SURGERY | Age: 42
End: 2017-04-05

## 2017-04-05 VITALS
SYSTOLIC BLOOD PRESSURE: 116 MMHG | BODY MASS INDEX: 30.55 KG/M2 | WEIGHT: 166 LBS | HEART RATE: 70 BPM | HEIGHT: 62 IN | DIASTOLIC BLOOD PRESSURE: 60 MMHG | OXYGEN SATURATION: 100 %

## 2017-04-05 DIAGNOSIS — Z98.84 STATUS POST BARIATRIC SURGERY: ICD-10-CM

## 2017-04-05 DIAGNOSIS — K90.9 INTESTINAL MALABSORPTION, UNSPECIFIED TYPE: Primary | ICD-10-CM

## 2017-04-05 NOTE — PROGRESS NOTES
1.25 years post sleeve resection / 1 month post lap sujatha follow-up    Subjective:     Arian Waldrop  is a 38 y.o. female who presents for follow-up about 1.25 years following laparoscopic sleeve gastrectomy. She has lost a total of 105 pounds since surgery. Body mass index is 30.36 kg/(m^2). . EBWL is (72%). The patient presents today to assess their progress toward their goal of weight loss and to address any issues that may be present. Today the patient and I have reviewed their diet and how appropriate their food choices are. The following issues have been identified  - one month post lap sujatha with IOC. Pain assessment - 2/10   . Surgery related complication: NA       She reports some new PO intolerance issues and denies vomiting, diarrhea and difficulty breathing. The patient's exercise level: not active. Changes in her medical history and medications have been reviewed. Patient Active Problem List   Diagnosis Code    Psoriasis L40.9    Hypothyroid E03.9    Anemia D64.9    Psoriatic arthritis ([address] L40.50    Anxiety F41.9    Intestinal malabsorption K90.9    Status post bariatric surgery Z98.84    Abdominal pain R10.9     Past Medical History:   Diagnosis Date    Abdominal pain     Anemia     Anxiety     GERD (gastroesophageal reflux disease)     Hypercholesterolemia     Hypothyroid     Intestinal malabsorption     Psoriasis     Psoriatic arthritis ([address]     Severe obesity ([address]     Status post bariatric surgery 1/2016    sleeve resection / terracina     Past Surgical History:   Procedure Laterality Date    HX GI  1/2016    sleeve resection / terracina    HX LAP CHOLECYSTECTOMY      HX ORTHOPAEDIC      torn labrum- right shoulder     Current Outpatient Prescriptions   Medication Sig Dispense Refill    levothyroxine (SYNTHROID) 112 mcg tablet Take 112 mcg by mouth Daily (before breakfast).       ergocalciferol (VITAMIN D2) 50,000 unit capsule Take 50,000 Units by mouth every seven (7) days.  LINACLOTIDE (LINZESS PO) Take 145 mcg by mouth daily.  citalopram (CELEXA) 20 mg tablet Take  by mouth daily.  FERROUS SULFATE, DRIED (IRON, DRIED, PO) Take 325 mg by mouth two (2) times a day.  cyanocobalamin (VITAMIN B-12) 1,000 mcg sublingual tablet Take 1,000 mcg by mouth daily.  multivitamin with iron (FLINTSTONES) chewable tablet Take 2 Tabs by mouth daily.  HYDROmorphone (DILAUDID) 2 mg tablet Take 1 Tab by mouth every four (4) hours as needed for Pain. Max Daily Amount: 12 mg. 25 Tab 0       Review of Symptoms:     General - No history or complaints of unexpected fever or chills  Head/Neck - No history or complaints of headache or dizziness  Cardiac - No history or complaints of chest pain, palpitations, or shortness of breath  Pulmonary - No history or complaints of shortness of breath or productive cough  Gastrointestinal - as noted above  Genitourinary - No history or complaints of hematuria/dysuria or renal lithiasis  Musculoskeletal - No history or complaints of joint  muscular weakness  Hematologic - No history of any bleeding episodes  Neurologic - No history or complaints of  migraine headaches or neurologic symptoms    Objective:     Visit Vitals    /60 (BP 1 Location: Left arm, BP Patient Position: Sitting)    Pulse 70    Ht 5' 2\" (1.575 m)    Wt 75.3 kg (166 lb)    SpO2 100%    BMI 30.36 kg/m2        Physical Exam:    General:  alert, cooperative, no distress, appears stated age   Lungs:   clear to auscultation bilaterally   Heart:  Regular rate and rhythm   Abdomen:   abdomen is soft without significant tenderness, masses, organomegaly or guarding; Incisions: healing well, no significant drainage         Labs:     Recent Results (from the past 2016 hour(s))   CBC WITH AUTOMATED DIFF    Collection Time: 01/20/17  7:30 PM   Result Value Ref Range    WBC 6.8 4.6 - 13.2 K/uL    RBC 5.09 4. 20 - 5.30 M/uL    HGB 10.5 (L) 12.0 - 16.0 g/dL    HCT 34.7 (L) 35.0 - 45.0 %    MCV 68.2 (L) 74.0 - 97.0 FL    MCH 20.6 (L) 24.0 - 34.0 PG    MCHC 30.3 (L) 31.0 - 37.0 g/dL    RDW 18.1 (H) 11.6 - 14.5 %    PLATELET 847 955 - 121 K/uL    NEUTROPHILS 51 40 - 73 %    LYMPHOCYTES 43 21 - 52 %    MONOCYTES 5 3 - 10 %    EOSINOPHILS 1 0 - 5 %    BASOPHILS 0 0 - 2 %    ABS. NEUTROPHILS 3.5 1.8 - 8.0 K/UL    ABS. LYMPHOCYTES 2.9 0.9 - 3.6 K/UL    ABS. MONOCYTES 0.3 0.05 - 1.2 K/UL    ABS. EOSINOPHILS 0.1 0.0 - 0.4 K/UL    ABS. BASOPHILS 0.0 0.0 - 0.06 K/UL    PLATELET COMMENTS LARGE PLATELETS      RBC COMMENTS ANISOCYTOSIS  1+        RBC COMMENTS MICROCYTOSIS  1+        RBC COMMENTS HYPOCHROMIA  1+        RBC COMMENTS POIKILOCYTOSIS  1+        RBC COMMENTS TARGET CELLS  OCCASIONAL  OVALOCYTES  1+        RBC COMMENTS SCHISTOCYTES  OCCASIONAL        DF AUTOMATED     METABOLIC PANEL, COMPREHENSIVE    Collection Time: 01/20/17  7:30 PM   Result Value Ref Range    Sodium 141 136 - 145 mmol/L    Potassium 3.4 (L) 3.5 - 5.5 mmol/L    Chloride 105 100 - 108 mmol/L    CO2 26 21 - 32 mmol/L    Anion gap 10 3.0 - 18 mmol/L    Glucose 111 (H) 74 - 99 mg/dL    BUN 15 7.0 - 18 MG/DL    Creatinine 0.64 0.6 - 1.3 MG/DL    BUN/Creatinine ratio 23 (H) 12 - 20      GFR est AA >60 >60 ml/min/1.73m2    GFR est non-AA >60 >60 ml/min/1.73m2    Calcium 9.2 8.5 - 10.1 MG/DL    Bilirubin, total 0.4 0.2 - 1.0 MG/DL    ALT (SGPT) 39 13 - 56 U/L    AST (SGOT) 20 15 - 37 U/L    Alk.  phosphatase 62 45 - 117 U/L    Protein, total 8.2 6.4 - 8.2 g/dL    Albumin 4.0 3.4 - 5.0 g/dL    Globulin 4.2 (H) 2.0 - 4.0 g/dL    A-G Ratio 1.0 0.8 - 1.7     LIPASE    Collection Time: 01/20/17  7:30 PM   Result Value Ref Range    Lipase 123 73 - 393 U/L   URINALYSIS W/ RFLX MICROSCOPIC    Collection Time: 01/20/17  7:30 PM   Result Value Ref Range    Color YELLOW      Appearance CLEAR      Specific gravity 1.016 1.005 - 1.030      pH (UA) 5.0 5.0 - 8.0      Protein NEGATIVE  NEG mg/dL    Glucose NEGATIVE NEG mg/dL    Ketone NEGATIVE  NEG mg/dL    Bilirubin NEGATIVE  NEG      Blood NEGATIVE  NEG      Urobilinogen 0.2 0.2 - 1.0 EU/dL    Nitrites NEGATIVE  NEG      Leukocyte Esterase NEGATIVE  NEG     VITAMIN D, 25 HYDROXY    Collection Time: 02/01/17 11:47 AM   Result Value Ref Range    Vitamin D 25-Hydroxy 38.9 30 - 100 ng/mL   VITAMIN B12 & FOLATE    Collection Time: 02/01/17 11:47 AM   Result Value Ref Range    Vitamin B12 1900 (H) 211 - 911 pg/mL    Folate >20.0 (H) 3.10 - 17.50 ng/mL   CBC WITH AUTOMATED DIFF    Collection Time: 02/01/17 11:47 AM   Result Value Ref Range    WBC 4.2 (L) 4.6 - 13.2 K/uL    RBC 4.66 4.20 - 5.30 M/uL    HGB 9.5 (L) 12.0 - 16.0 g/dL    HCT 32.1 (L) 35.0 - 45.0 %    MCV 68.9 (L) 74.0 - 97.0 FL    MCH 20.4 (L) 24.0 - 34.0 PG    MCHC 29.6 (L) 31.0 - 37.0 g/dL    RDW 18.1 (H) 11.6 - 14.5 %    PLATELET 222 730 - 466 K/uL    NEUTROPHILS 52 40 - 73 %    LYMPHOCYTES 39 21 - 52 %    MONOCYTES 7 3 - 10 %    EOSINOPHILS 1 0 - 5 %    BASOPHILS 1 0 - 2 %    ABS. NEUTROPHILS 2.3 1.8 - 8.0 K/UL    ABS. LYMPHOCYTES 1.6 0.9 - 3.6 K/UL    ABS. MONOCYTES 0.3 0.05 - 1.2 K/UL    ABS. EOSINOPHILS 0.0 0.0 - 0.4 K/UL    ABS.  BASOPHILS 0.0 0.0 - 0.06 K/UL    PLATELET COMMENTS LARGE PLATELETS      RBC COMMENTS MICROCYTOSIS  1+        RBC COMMENTS POIKILOCYTOSIS  1+        RBC COMMENTS OVALOCYTES  1+        DF AUTOMATED     FERRITIN    Collection Time: 02/01/17 11:47 AM   Result Value Ref Range    Ferritin 13 8 - 388 NG/ML   IRON    Collection Time: 02/01/17 11:47 AM   Result Value Ref Range    Iron 15 (L) 50 - 051 ug/dL   METABOLIC PANEL, COMPREHENSIVE    Collection Time: 02/01/17 11:47 AM   Result Value Ref Range    Sodium 144 136 - 145 mmol/L    Potassium 4.1 3.5 - 5.5 mmol/L    Chloride 106 100 - 108 mmol/L    CO2 28 21 - 32 mmol/L    Anion gap 10 3.0 - 18 mmol/L    Glucose 75 74 - 99 mg/dL    BUN 13 7.0 - 18 MG/DL    Creatinine 0.59 (L) 0.6 - 1.3 MG/DL    BUN/Creatinine ratio 22 (H) 12 - 20      GFR est AA >60 >60 ml/min/1.73m2    GFR est non-AA >60 >60 ml/min/1.73m2    Calcium 9.1 8.5 - 10.1 MG/DL    Bilirubin, total 0.4 0.2 - 1.0 MG/DL    ALT (SGPT) 48 13 - 56 U/L    AST (SGOT) 18 15 - 37 U/L    Alk. phosphatase 58 45 - 117 U/L    Protein, total 7.3 6.4 - 8.2 g/dL    Albumin 4.0 3.4 - 5.0 g/dL    Globulin 3.3 2.0 - 4.0 g/dL    A-G Ratio 1.2 0.8 - 1.7     VITAMIN B1, WHOLE BLOOD    Collection Time: 02/01/17 11:47 AM   Result Value Ref Range    Vitamin B1 140.1 66.5 - 200.0 nmol/L   CBC WITH AUTOMATED DIFF    Collection Time: 03/04/17 12:32 PM   Result Value Ref Range    WBC 4.7 4.6 - 13.2 K/uL    RBC 4.82 4.20 - 5.30 M/uL    HGB 10.1 (L) 12.0 - 16.0 g/dL    HCT 34.0 (L) 35.0 - 45.0 %    MCV 70.5 (L) 74.0 - 97.0 FL    MCH 21.0 (L) 24.0 - 34.0 PG    MCHC 29.7 (L) 31.0 - 37.0 g/dL    RDW 18.1 (H) 11.6 - 14.5 %    PLATELET 287 443 - 498 K/uL    NEUTROPHILS 56 40 - 73 %    LYMPHOCYTES 36 21 - 52 %    MONOCYTES 7 3 - 10 %    EOSINOPHILS 1 0 - 5 %    BASOPHILS 0 0 - 2 %    ABS. NEUTROPHILS 2.7 1.8 - 8.0 K/UL    ABS. LYMPHOCYTES 1.7 0.9 - 3.6 K/UL    ABS. MONOCYTES 0.3 0.05 - 1.2 K/UL    ABS. EOSINOPHILS 0.0 0.0 - 0.4 K/UL    ABS. BASOPHILS 0.0 0.0 - 0.06 K/UL    DF AUTOMATED      RBC COMMENTS ANISOCYTOSIS  1+        RBC COMMENTS HYPOCHROMIA  1+       METABOLIC PANEL, COMPREHENSIVE    Collection Time: 03/04/17 12:32 PM   Result Value Ref Range    Sodium 145 136 - 145 mmol/L    Potassium 3.8 3.5 - 5.5 mmol/L    Chloride 109 (H) 100 - 108 mmol/L    CO2 28 21 - 32 mmol/L    Anion gap 8 3.0 - 18 mmol/L    Glucose 70 (L) 74 - 99 mg/dL    BUN 15 7.0 - 18 MG/DL    Creatinine 0.65 0.6 - 1.3 MG/DL    BUN/Creatinine ratio 23 (H) 12 - 20      GFR est AA >60 >60 ml/min/1.73m2    GFR est non-AA >60 >60 ml/min/1.73m2    Calcium 8.7 8.5 - 10.1 MG/DL    Bilirubin, total 0.3 0.2 - 1.0 MG/DL    ALT (SGPT) 34 13 - 56 U/L    AST (SGOT) 14 (L) 15 - 37 U/L    Alk.  phosphatase 52 45 - 117 U/L    Protein, total 7.1 6.4 - 8.2 g/dL    Albumin 3.4 3.4 - 5.0 g/dL    Globulin 3.7 2.0 - 4.0 g/dL    A-G Ratio 0.9 0.8 - 1.7     HCG QL SERUM    Collection Time: 03/09/17 10:14 AM   Result Value Ref Range    HCG, Ql. NEGATIVE  NEG         Assessment:     1. History of Morbid obesity, status post  laparoscopic sleeve gastrectomy. The patient is having some expected PO intolerance post lap sujatha. She desires another 10 lbs of weight loss. Plan:     1. Remember to measure portions, continue low carbohydrate diet  2. Reviewed labs and appropriate changes made to vitamin regimen  3. Remember vitamin supplements. 4. Exercise regimen appears adequate. 5. Attend support group  6. Follow-up in 3 month(s). 7. The patient understands the plan of action  8. Total time spent with the patient 30 minutes.

## 2017-04-05 NOTE — PATIENT INSTRUCTIONS
Body Mass Index: Care Instructions  Your Care Instructions    Body mass index (BMI) can help you see if your weight is raising your risk for health problems. It uses a formula to compare how much you weigh with how tall you are. · A BMI lower than 18.5 is considered underweight. · A BMI between 18.5 and 24.9 is considered healthy. · A BMI between 25 and 29.9 is considered overweight. A BMI of 30 or higher is considered obese. If your BMI is in the normal range, it means that you have a lower risk for weight-related health problems. If your BMI is in the overweight or obese range, you may be at increased risk for weight-related health problems, such as high blood pressure, heart disease, stroke, arthritis or joint pain, and diabetes. If your BMI is in the underweight range, you may be at increased risk for health problems such as fatigue, lower protection (immunity) against illness, muscle loss, bone loss, hair loss, and hormone problems. BMI is just one measure of your risk for weight-related health problems. You may be at higher risk for health problems if you are not active, you eat an unhealthy diet, or you drink too much alcohol or use tobacco products. Follow-up care is a key part of your treatment and safety. Be sure to make and go to all appointments, and call your doctor if you are having problems. It's also a good idea to know your test results and keep a list of the medicines you take. How can you care for yourself at home? · Practice healthy eating habits. This includes eating plenty of fruits, vegetables, whole grains, lean protein, and low-fat dairy. · If your doctor recommends it, get more exercise. Walking is a good choice. Bit by bit, increase the amount you walk every day. Try for at least 30 minutes on most days of the week. · Do not smoke. Smoking can increase your risk for health problems. If you need help quitting, talk to your doctor about stop-smoking programs and medicines. These can increase your chances of quitting for good. · Limit alcohol to 2 drinks a day for men and 1 drink a day for women. Too much alcohol can cause health problems. If you have a BMI higher than 25  · Your doctor may do other tests to check your risk for weight-related health problems. This may include measuring the distance around your waist. A waist measurement of more than 40 inches in men or 35 inches in women can increase the risk of weight-related health problems. · Talk with your doctor about steps you can take to stay healthy or improve your health. You may need to make lifestyle changes to lose weight and stay healthy, such as changing your diet and getting regular exercise. If you have a BMI lower than 18.5  · Your doctor may do other tests to check your risk for health problems. · Talk with your doctor about steps you can take to stay healthy or improve your health. You may need to make lifestyle changes to gain or maintain weight and stay healthy, such as getting more healthy foods in your diet and doing exercises to build muscle. Where can you learn more? Go to http://christopher-vitor.info/. Enter S176 in the search box to learn more about \"Body Mass Index: Care Instructions. \"  Current as of: January 23, 2017  Content Version: 11.2  © 8495-3935 [employer]. Care instructions adapted under license by [employer] (which disclaims liability or warranty for this information). If you have questions about a medical condition or this instruction, always ask your healthcare professional. [place] [address] any warranty or liability for your use of this information.

## 2017-04-05 NOTE — MR AVS SNAPSHOT
Visit Information Date & Time Provider Department Dept. Phone Encounter #  
 4/5/2017  9:45 AM Hoa Tran MD [facility] [phone] Your Appointments 7/5/2017  9:15 AM  
EST PATIENT PROBLEM with Hoa Tran MD  
[id number] [facility]) Appt Note: f/u  
 [address] [place] [address] [facility]  
  
   
 [address] [place] [address] Upcoming Health Maintenance Date Due DTaP/Tdap/Td series (1 - Tdap) 3/17/1998 PAP AKA CERVICAL CYTOLOGY 3/17/1998 INFLUENZA AGE 9 TO ADULT 8/1/2016 Allergies as of 4/5/2017  Review Complete On: 4/5/2017 By: Soo [place] Severity Noted Reaction Type Reactions Other Food  02/28/2017    Other (comments) Bananas and soy per allergy testing Bactrim [Sulfamethoprim]  06/25/2015    Rash, Other (comments) Peanut  02/28/2017    Other (comments) Per allergy testing Vicodin [Hydrocodone-acetaminophen]  06/25/2015    Swelling Current Immunizations  Never Reviewed No immunizations on file. Not reviewed this visit Vitals BP Pulse Height(growth percentile) Weight(growth percentile) SpO2 BMI  
 116/60 (BP 1 Location: Left arm, BP Patient Position: Sitting) 70 5' 2\" (1.575 m) 166 lb (75.3 kg) 100% 30.36 kg/m2 OB Status Smoking Status Having regular periods Never Smoker BMI and BSA Data Body Mass Index Body Surface Area  
 30.36 kg/m 2 1.81 m 2 Preferred Pharmacy Pharmacy Name Phone [id number] - [place], [address] [place] AT [address] [phone] Your Updated Medication List  
  
   
This list is accurate as of: 4/5/17 10:13 AM.  Always use your most recent med list.  
  
  
  
  
 CeleXA 20 mg tablet Generic drug:  citalopram  
 Take  by mouth daily. cyanocobalamin 1,000 mcg sublingual tablet Commonly known as:  VITAMIN B-12 Take 1,000 mcg by mouth daily. HYDROmorphone 2 mg tablet Commonly known as:  DILAUDID Take 1 Tab by mouth every four (4) hours as needed for Pain. Max Daily Amount: 12 mg. IRON (DRIED) PO Take 325 mg by mouth two (2) times a day. LINZESS PO Take 145 mcg by mouth daily. multivitamin with iron chewable tablet Commonly known as:  Naomi Frieze Take 2 Tabs by mouth daily. SYNTHROID 112 mcg tablet Generic drug:  levothyroxine Take 112 mcg by mouth Daily (before breakfast). VITAMIN D2 50,000 unit capsule Generic drug:  ergocalciferol Take 50,000 Units by mouth every seven (7) days. Patient Instructions Body Mass Index: Care Instructions Your Care Instructions Body mass index (BMI) can help you see if your weight is raising your risk for health problems. It uses a formula to compare how much you weigh with how tall you are. · A BMI lower than 18.5 is considered underweight. · A BMI between 18.5 and 24.9 is considered healthy. · A BMI between 25 and 29.9 is considered overweight. A BMI of 30 or higher is considered obese. If your BMI is in the normal range, it means that you have a lower risk for weight-related health problems. If your BMI is in the overweight or obese range, you may be at increased risk for weight-related health problems, such as high blood pressure, heart disease, stroke, arthritis or joint pain, and diabetes. If your BMI is in the underweight range, you may be at increased risk for health problems such as fatigue, lower protection (immunity) against illness, muscle loss, bone loss, hair loss, and hormone problems. BMI is just one measure of your risk for weight-related health problems.  You may be at higher risk for health problems if you are not active, you eat an unhealthy diet, or you drink too much alcohol or use tobacco products. Follow-up care is a key part of your treatment and safety. Be sure to make and go to all appointments, and call your doctor if you are having problems. It's also a good idea to know your test results and keep a list of the medicines you take. How can you care for yourself at home? · Practice healthy eating habits. This includes eating plenty of fruits, vegetables, whole grains, lean protein, and low-fat dairy. · If your doctor recommends it, get more exercise. Walking is a good choice. Bit by bit, increase the amount you walk every day. Try for at least 30 minutes on most days of the week. · Do not smoke. Smoking can increase your risk for health problems. If you need help quitting, talk to your doctor about stop-smoking programs and medicines. These can increase your chances of quitting for good. · Limit alcohol to 2 drinks a day for men and 1 drink a day for women. Too much alcohol can cause health problems. If you have a BMI higher than 25 · Your doctor may do other tests to check your risk for weight-related health problems. This may include measuring the distance around your waist. A waist measurement of more than 40 inches in men or 35 inches in women can increase the risk of weight-related health problems. · Talk with your doctor about steps you can take to stay healthy or improve your health. You may need to make lifestyle changes to lose weight and stay healthy, such as changing your diet and getting regular exercise. If you have a BMI lower than 18.5 · Your doctor may do other tests to check your risk for health problems. · Talk with your doctor about steps you can take to stay healthy or improve your health. You may need to make lifestyle changes to gain or maintain weight and stay healthy, such as getting more healthy foods in your diet and doing exercises to build muscle. Where can you learn more? Go to http://christopher-vitor.info/. Enter S176 in the search box to learn more about \"Body Mass Index: Care Instructions. \" Current as of: January 23, 2017 Content Version: 11.2 © 8729-9189 [employer]. Care instructions adapted under license by [employer] (which disclaims liability or warranty for this information). If you have questions about a medical condition or this instruction, always ask your healthcare professional. [address] any warranty or liability for your use of this information. Introducing [facility] [place] [facility]! Cindy Cantor introduces [employer] patient portal. Now you can access parts of your medical record, email your doctor's office, and request medication refills online. 1. In your internet browser, go to [email or web address] 2. Click on the First Time User? Click Here link in the Sign In box. You will see the New Member Sign Up page. 3. Enter your [employer] Access Code exactly as it appears below. You will not need to use this code after youve completed the sign-up process. If you do not sign up before the expiration date, you must request a new code. · [employer] Access Code: [email or web address] Expires: 4/20/2017  7:51 PM 
 
4. Enter the last four digits of your Social Security Number (xxxx) and Date of Birth (mm/dd/yyyy) as indicated and click Submit. You will be taken to the next sign-up page. 5. Create a [employer] ID. This will be your [employer] login ID and cannot be changed, so think of one that is secure and easy to remember. 6. Create a [employer] password. You can change your password at any time. 7. Enter your Password Reset Question and Answer. This can be used at a later time if you forget your password. 8. Enter your e-mail address. You will receive e-mail notification when new information is available in [address] 9. Click Sign Up. You can now view and download portions of your medical record. 10. Click the Download Summary menu link to download a portable copy of your medical information. If you have questions, please visit the Frequently Asked Questions section of the [employer] website. Remember, [employer] is NOT to be used for urgent needs. For medical emergencies, dial 911. Now available from your iPhone and Android! Please provide this summary of care documentation to your next provider. Your primary care clinician is listed as Dianelys Deras. If you have any questions after today's visit, please call [phone].

## 2017-04-18 ENCOUNTER — HOSPITAL ENCOUNTER (OUTPATIENT)
Dept: LAB | Age: 42
Discharge: HOME | End: 2017-04-18
Payer: COMMERCIAL

## 2017-04-18 LAB
25(OH)D3 SERPL-MCNC: 31.9 NG/ML (ref 30–100)
BASOPHILS # BLD AUTO: 0 K/UL (ref 0–0.06)
BASOPHILS # BLD: 0 % (ref 0–2)
DIFFERENTIAL METHOD BLD: ABNORMAL
EOSINOPHIL # BLD: 0.1 K/UL (ref 0–0.4)
EOSINOPHIL NFR BLD: 1 % (ref 0–5)
ERYTHROCYTE [DISTWIDTH] IN BLOOD BY AUTOMATED COUNT: 19.3 % (ref 11.6–14.5)
ERYTHROCYTE [SEDIMENTATION RATE] IN BLOOD: 22 MM/HR (ref 0–20)
FERRITIN SERPL-MCNC: 44 NG/ML (ref 8–388)
HCT VFR BLD AUTO: 35.4 % (ref 35–45)
HGB BLD-MCNC: 10.8 G/DL (ref 12–16)
LYMPHOCYTES # BLD AUTO: 44 % (ref 21–52)
LYMPHOCYTES # BLD: 2.5 K/UL (ref 0.9–3.6)
MCH RBC QN AUTO: 22.1 PG (ref 24–34)
MCHC RBC AUTO-ENTMCNC: 30.5 G/DL (ref 31–37)
MCV RBC AUTO: 72.4 FL (ref 74–97)
MONOCYTES # BLD: 0.4 K/UL (ref 0.05–1.2)
MONOCYTES NFR BLD AUTO: 7 % (ref 3–10)
NEUTS SEG # BLD: 2.8 K/UL (ref 1.8–8)
NEUTS SEG NFR BLD AUTO: 48 % (ref 40–73)
PLATELET # BLD AUTO: 185 K/UL (ref 135–420)
RBC # BLD AUTO: 4.89 M/UL (ref 4.2–5.3)
TSH SERPL DL<=0.05 MIU/L-ACNC: 0.84 UIU/ML (ref 0.36–3.74)
VIT B12 SERPL-MCNC: 708 PG/ML (ref 211–911)
WBC # BLD AUTO: 5.7 K/UL (ref 4.6–13.2)

## 2017-04-18 PROCEDURE — 82607 VITAMIN B-12: CPT | Performed by: INTERNAL MEDICINE

## 2017-04-18 PROCEDURE — 82306 VITAMIN D 25 HYDROXY: CPT | Performed by: INTERNAL MEDICINE

## 2017-04-18 PROCEDURE — 36415 COLL VENOUS BLD VENIPUNCTURE: CPT | Performed by: INTERNAL MEDICINE

## 2017-04-18 PROCEDURE — 84443 ASSAY THYROID STIM HORMONE: CPT | Performed by: INTERNAL MEDICINE

## 2017-04-18 PROCEDURE — 82728 ASSAY OF FERRITIN: CPT | Performed by: INTERNAL MEDICINE

## 2017-04-18 PROCEDURE — 85025 COMPLETE CBC W/AUTO DIFF WBC: CPT | Performed by: INTERNAL MEDICINE

## 2017-04-18 PROCEDURE — 85652 RBC SED RATE AUTOMATED: CPT | Performed by: INTERNAL MEDICINE

## 2017-04-21 LAB
FAX TO INFO,FAXT: NORMAL
FAX TO NUMBER,FAXN: NORMAL

## 2017-08-23 ENCOUNTER — OFFICE VISIT (OUTPATIENT)
Dept: SURGERY | Age: 42
End: 2017-08-23

## 2017-08-23 VITALS
RESPIRATION RATE: 16 BRPM | SYSTOLIC BLOOD PRESSURE: 111 MMHG | OXYGEN SATURATION: 100 % | DIASTOLIC BLOOD PRESSURE: 64 MMHG | BODY MASS INDEX: 30 KG/M2 | HEIGHT: 62 IN | WEIGHT: 163 LBS | HEART RATE: 60 BPM

## 2017-08-23 DIAGNOSIS — K90.9 INTESTINAL MALABSORPTION, UNSPECIFIED TYPE: Primary | ICD-10-CM

## 2017-08-23 RX ORDER — NYSTATIN 100000 U/G
CREAM TOPICAL 2 TIMES DAILY
Qty: 15 G | Refills: 2 | Status: SHIPPED | OUTPATIENT
Start: 2017-08-23 | End: 2020-07-29 | Stop reason: ALTCHOICE

## 2017-08-23 NOTE — MR AVS SNAPSHOT
Visit Information Date & Time Provider Department Dept. Phone Encounter #  
 8/23/2017 10:15 AM MD Aracely Tang [facility] [phone] [id number] Follow-up Instructions Return in about 6 months (around 2/23/2018). Follow-up and Disposition History Your Appointments 2/21/2018 11:00 AM  
[facility] with [facility] [address] [facility]) Appt Note: f/u  
 [address] [place] [address] [place] [address] [facility] [place] [facility]  
  
   
 [address] [place] [address] Upcoming Health Maintenance Date Due DTaP/Tdap/Td series (1 - Tdap) 3/17/1998 PAP AKA CERVICAL CYTOLOGY 3/17/1998 INFLUENZA AGE 9 TO ADULT 8/1/2017 Allergies as of 8/23/2017  Review Complete On: 8/23/2017 By: Preethi Craig MD  
  
 Severity Noted Reaction Type Reactions Other Food  02/28/2017    Other (comments) Bananas and soy per allergy testing Bactrim [Sulfamethoprim]  06/25/2015    Rash, Other (comments) Peanut  02/28/2017    Other (comments) Per allergy testing Vicodin [Hydrocodone-acetaminophen]  06/25/2015    Swelling Current Immunizations  Never Reviewed No immunizations on file. Not reviewed this visit You Were Diagnosed With   
  
 Codes Comments Intestinal malabsorption, unspecified type    -  Primary ICD-10-CM: K90.9 ICD-9-CM: 579.9 Vitals BP Pulse Resp Height(growth percentile) Weight(growth percentile) SpO2  
 111/64 (BP 1 Location: Left arm, BP Patient Position: Sitting) 60 16 5' 2\" (1.575 m) 163 lb (73.9 kg) 100% BMI OB Status Smoking Status 29.81 kg/m2 Having regular periods Never Smoker Vitals History BMI and BSA Data Body Mass Index Body Surface Area  
 29.81 kg/m 2 1.8 m 2 Preferred Pharmacy Pharmacy Name Phone [id number] - [place], [address] [facility] AT [address] [phone] Your Updated Medication List  
  
   
This list is accurate as of: 8/23/17 11:58 AM.  Always use your most recent med list.  
  
  
  
  
 CeleXA 20 mg tablet Generic drug:  citalopram  
Take 40 mg by mouth daily. cyanocobalamin 1,000 mcg sublingual tablet Commonly known as:  VITAMIN B-12 Take 1,000 mcg by mouth daily. IRON (DRIED) PO Take 325 mg by mouth two (2) times a day. LINZESS PO Take 145 mcg by mouth daily. multivitamin with iron chewable tablet Commonly known as:  Marianna Logan Take 2 Tabs by mouth daily. SYNTHROID 112 mcg tablet Generic drug:  levothyroxine Take 112 mcg by mouth Daily (before breakfast). VITAMIN D2 50,000 unit capsule Generic drug:  ergocalciferol Take 50,000 Units by mouth every seven (7) days. Follow-up Instructions Return in about 6 months (around 2/23/2018). To-Do List   
 08/23/2017 Lab:  VITAMIN D, 25 HYDROXY   
  
 10/22/2017 Lab:  CBC WITH AUTOMATED DIFF   
  
 10/22/2017 Lab:  FERRITIN   
  
 10/22/2017 Lab:  IRON   
  
 10/22/2017 Lab:  METABOLIC PANEL, COMPREHENSIVE   
  
 10/22/2017 Lab:  VITAMIN B1, WHOLE BLOOD   
  
 10/22/2017 Lab:  VITAMIN B12 & FOLATE Patient Instructions Body Mass Index: Care Instructions Your Care Instructions Body mass index (BMI) can help you see if your weight is raising your risk for health problems. It uses a formula to compare how much you weigh with how tall you are. · A BMI lower than 18.5 is considered underweight. · A BMI between 18.5 and 24.9 is considered healthy. · A BMI between 25 and 29.9 is considered overweight. A BMI of 30 or higher is considered obese.  
If your BMI is in the normal range, it means that you have a lower risk for weight-related health problems. If your BMI is in the overweight or obese range, you may be at increased risk for weight-related health problems, such as high blood pressure, heart disease, stroke, arthritis or joint pain, and diabetes. If your BMI is in the underweight range, you may be at increased risk for health problems such as fatigue, lower protection (immunity) against illness, muscle loss, bone loss, hair loss, and hormone problems. BMI is just one measure of your risk for weight-related health problems. You may be at higher risk for health problems if you are not active, you eat an unhealthy diet, or you drink too much alcohol or use tobacco products. Follow-up care is a key part of your treatment and safety. Be sure to make and go to all appointments, and call your doctor if you are having problems. It's also a good idea to know your test results and keep a list of the medicines you take. How can you care for yourself at home? · Practice healthy eating habits. This includes eating plenty of fruits, vegetables, whole grains, lean protein, and low-fat dairy. · If your doctor recommends it, get more exercise. Walking is a good choice. Bit by bit, increase the amount you walk every day. Try for at least 30 minutes on most days of the week. · Do not smoke. Smoking can increase your risk for health problems. If you need help quitting, talk to your doctor about stop-smoking programs and medicines. These can increase your chances of quitting for good. · Limit alcohol to 2 drinks a day for men and 1 drink a day for women. Too much alcohol can cause health problems. If you have a BMI higher than 25 · Your doctor may do other tests to check your risk for weight-related health problems. This may include measuring the distance around your waist. A waist measurement of more than 40 inches in men or 35 inches in women can increase the risk of weight-related health problems. · Talk with your doctor about steps you can take to stay healthy or improve your health. You may need to make lifestyle changes to lose weight and stay healthy, such as changing your diet and getting regular exercise. If you have a BMI lower than 18.5 · Your doctor may do other tests to check your risk for health problems. · Talk with your doctor about steps you can take to stay healthy or improve your health. You may need to make lifestyle changes to gain or maintain weight and stay healthy, such as getting more healthy foods in your diet and doing exercises to build muscle. Where can you learn more? Go to http://christopher-vitor.info/. Enter S176 in the search box to learn more about \"Body Mass Index: Care Instructions. \" Current as of: January 23, 2017 Content Version: 11.3 © 3969-4987 [employer]. Care instructions adapted under license by [employer] (which disclaims liability or warranty for this information). If you have questions about a medical condition or this instruction, always ask your healthcare professional. [place] [address] any warranty or liability for your use of this information. Patient Instructions 1. Continue to monitor carbohydrate and protein intake- remember to keep your           total  carbohydrates to 50 grams or less per day for best results. 2. Remember hydration goals - usually 48 to 64 ounces of liquids per day 3. Continue to work towards exercise goals - minimum 3 days per week of 45          minutes to  1 hour at a time. 4. Remember to take vitamins as directed Supplement Resource Guide Importance of Protein:  
Maintains lean body mass, produces antibodies to fight off infections, heals wounds, minimizes hair loss, helps to give you energy, helps with satiety, and keeping you full between meals. Importance of Calcium: Needed for healthy bones and teeth, normal blood clotting, and nervous system functioning, higher risk of osteoporosis and bone disease with non-compliance. Importance of Multivitamins: Many functions. Supply you with extra nutrients that you may be missing from food. May lead to iron deficiency anemia, weakness, fatigue, and many other symptoms with non-compliance. Importance of B Vitamins: 
Important for red blood cell formation, metabolism, energy, and helps to maintain a healthy nervous system. Protein Supplement Find one you like now. Use immediately after surgery. Look for: 
35-50g protein each day from your protein supplement once you reach the progression diet. 0-3 g fat per serving 0-3 g sugar per serving Protein drinks should be split in separate dosages. Recommend: Lifelong 1 year + Calcium Supplement:  
 
Start taking within a month after surgery. Look for: Calcium Citrate Plus D (1500 mg per day) Recommend: Citracal 
 
 . Avoid chocolate chewable calcium. Can use chewable bariatric or GNC brand or similar chewable. The body cannot absorb more than 500-600 mg @ a time. Take for Life Multi-vitamin Supplement:   
 
1st Month After Surgery: Any complete chewable, such as: [place]s Complete chewables. Avoid [place]lin sours or gummies. They lack iron and other important nutrients and also have added sugar. Continue with chewable vitamin or change to adult complete multivitamin one month after surgery. Menstruating women can take a prenatal vitamin. Make sure has at least 18 mg iron and 442-889 mcg folic acid): Vitamin B12, B Complex Vitamin, and Biotin Start taking within a month after surgery. Vitamin B12:  1000 mcg of Vitamin B12 three times weekly Must take sublingually (meaning you take it under your tongue) or in a liquid drop form for easy absorption. B Complex Vitamin: Take a pill or liquid drop form once daily. Biotin: This vitamin can help prevent hair loss. Recommend 5mg  
(5000 mcg) a day Biotin is Optional  
 
 
 
 
 
 Patient Instructions History Introducing [facility]! Kay Mendez introduces FanLib patient portal. Now you can access parts of your medical record, email your doctor's office, and request medication refills online. 1. In your internet browser, go to [email or web address] 2. Click on the First Time User? Click Here link in the Sign In box. You will see the New Member Sign Up page. 3. Enter your FanLib Access Code exactly as it appears below. You will not need to use this code after youve completed the sign-up process. If you do not sign up before the expiration date, you must request a new code. · FanLib Access Code: [email or web address] Expires: 10/28/2017 12:29 PM 
 
4. Enter the last four digits of your Social Security Number (xxxx) and Date of Birth (mm/dd/yyyy) as indicated and click Submit. You will be taken to the next sign-up page. 5. Create a FanLib ID. This will be your FanLib login ID and cannot be changed, so think of one that is secure and easy to remember. 6. Create a FanLib password. You can change your password at any time. 7. Enter your Password Reset Question and Answer. This can be used at a later time if you forget your password. 8. Enter your e-mail address. You will receive e-mail notification when new information is available in [address]. 9. Click Sign Up. You can now view and download portions of your medical record. 10. Click the Download Summary menu link to download a portable copy of your medical information. If you have questions, please visit the Frequently Asked Questions section of the FanLib website. Remember, FanLib is NOT to be used for urgent needs. For medical emergencies, dial 911. Now available from your iPhone and Android! Please provide this summary of care documentation to your next provider. Your primary care clinician is listed as Phys Other. If you have any questions after today's visit, please call [phone].

## 2017-08-23 NOTE — PATIENT INSTRUCTIONS
Body Mass Index: Care Instructions  Your Care Instructions    Body mass index (BMI) can help you see if your weight is raising your risk for health problems. It uses a formula to compare how much you weigh with how tall you are. · A BMI lower than 18.5 is considered underweight. · A BMI between 18.5 and 24.9 is considered healthy. · A BMI between 25 and 29.9 is considered overweight. A BMI of 30 or higher is considered obese. If your BMI is in the normal range, it means that you have a lower risk for weight-related health problems. If your BMI is in the overweight or obese range, you may be at increased risk for weight-related health problems, such as high blood pressure, heart disease, stroke, arthritis or joint pain, and diabetes. If your BMI is in the underweight range, you may be at increased risk for health problems such as fatigue, lower protection (immunity) against illness, muscle loss, bone loss, hair loss, and hormone problems. BMI is just one measure of your risk for weight-related health problems. You may be at higher risk for health problems if you are not active, you eat an unhealthy diet, or you drink too much alcohol or use tobacco products. Follow-up care is a key part of your treatment and safety. Be sure to make and go to all appointments, and call your doctor if you are having problems. It's also a good idea to know your test results and keep a list of the medicines you take. How can you care for yourself at home? · Practice healthy eating habits. This includes eating plenty of fruits, vegetables, whole grains, lean protein, and low-fat dairy. · If your doctor recommends it, get more exercise. Walking is a good choice. Bit by bit, increase the amount you walk every day. Try for at least 30 minutes on most days of the week. · Do not smoke. Smoking can increase your risk for health problems. If you need help quitting, talk to your doctor about stop-smoking programs and medicines. These can increase your chances of quitting for good. · Limit alcohol to 2 drinks a day for men and 1 drink a day for women. Too much alcohol can cause health problems. If you have a BMI higher than 25  · Your doctor may do other tests to check your risk for weight-related health problems. This may include measuring the distance around your waist. A waist measurement of more than 40 inches in men or 35 inches in women can increase the risk of weight-related health problems. · Talk with your doctor about steps you can take to stay healthy or improve your health. You may need to make lifestyle changes to lose weight and stay healthy, such as changing your diet and getting regular exercise. If you have a BMI lower than 18.5  · Your doctor may do other tests to check your risk for health problems. · Talk with your doctor about steps you can take to stay healthy or improve your health. You may need to make lifestyle changes to gain or maintain weight and stay healthy, such as getting more healthy foods in your diet and doing exercises to build muscle. Where can you learn more? Go to http://christopher-vitor.info/. Enter S176 in the search box to learn more about \"Body Mass Index: Care Instructions. \"  Current as of: January 23, 2017  Content Version: 11.3  © 0283-1038 [employer]. Care instructions adapted under license by [employer] (which disclaims liability or warranty for this information). If you have questions about a medical condition or this instruction, always ask your healthcare professional. [place] [address] any warranty or liability for your use of this information. Patient Instructions      1. Continue to monitor carbohydrate and protein intake- remember to keep your           total  carbohydrates to 50 grams or less per day for best results.   2. Remember hydration goals - usually 48 to 64 ounces of liquids per day  3. Continue to work towards exercise goals - minimum 3 days per week of 45          minutes to  1 hour at a time. 4. Remember to take vitamins as directed        Supplement Resource Guide    Importance of Protein:   Maintains lean body mass, produces antibodies to fight off infections, heals wounds, minimizes hair loss, helps to give you energy, helps with satiety, and keeping you full between meals. Importance of Calcium:  Needed for healthy bones and teeth, normal blood clotting, and nervous system functioning, higher risk of osteoporosis and bone disease with non-compliance. Importance of Multivitamins: Many functions. Supply you with extra nutrients that you may be missing from food. May lead to iron deficiency anemia, weakness, fatigue, and many other symptoms with non-compliance. Importance of B Vitamins:  Important for red blood cell formation, metabolism, energy, and helps to maintain a healthy nervous system. Protein Supplement  Find one you like now. Use immediately after surgery. Look for:  35-50g protein each day from your protein supplement once you reach the progression diet. 0-3 g fat per serving  0-3 g sugar per serving    Protein drinks should be split in separate dosages. Recommend: Lifelong  1 year + Calcium Supplement:     Start taking within a month after surgery. Look for: Calcium Citrate Plus D (1500 mg per day)  Recommend: Citracal     .          Avoid chocolate chewable calcium. Can use chewable bariatric or GNC brand or similar chewable. The body cannot absorb more than 500-600 mg @ a time. Take for Life Multi-vitamin Supplement:      1st Month After Surgery: Any complete chewable, such as: [place]s Complete chewables. Avoid [place] sours or gummies. They lack iron and other important nutrients and also have added sugar.     Continue with chewable vitamin or change to adult complete multivitamin one month after surgery. Menstruating women can take a prenatal vitamin. Make sure has at least 18 mg iron and 726-715 mcg folic acid):    Vitamin B12, B Complex Vitamin, and Biotin  Start taking within a month after surgery. Vitamin B12:  1000 mcg of Vitamin B12 three times weekly    Must take sublingually (meaning you take it under your tongue) or in a liquid drop form for easy absorption. B Complex Vitamin: Take a pill or liquid drop form once daily. Biotin: This vitamin can help prevent hair loss.     Recommend 5mg   (5000 mcg) a day  Biotin is Optional

## 2017-08-23 NOTE — PROGRESS NOTES
Subjective:     Ketty Samuel  is a 38 y.o. female who presents for follow-up about 18 months following laparoscopic sleeve gastrectomy. She has   lost a total of 108 pounds since surgery. Body mass index is 29.81 kg/(m^2). . EBWL is (75%). The patient presents today to assess   their progress toward their goal of weight loss and to address any issues that may be present. Today the patient and I have reviewed   their diet and how appropriate their food choices are. The following issues have been identified : rash under pannus. .  Surgery related complication: 0       She reports rash and denies vomiting and abdominal pain. The patients diet choices have been reviewed today and are good   Patients pain score:0      The patient's exercise level: not active. Changes in her medical history and medications have been reviewed. Patient Active Problem List   Diagnosis Code    Psoriasis L40.9    Hypothyroid E03.9    Anemia D64.9    Psoriatic arthritis ([address] L40.50    Anxiety F41.9    Intestinal malabsorption K90.9    Status post bariatric surgery Z98.84     Past Medical History:   Diagnosis Date    Abdominal pain     Anemia     Anxiety     GERD (gastroesophageal reflux disease)     Hypercholesterolemia     Hypothyroid     Intestinal malabsorption     Psoriasis     Psoriatic arthritis ([address]     Severe obesity ([address]     Status post bariatric surgery 1/2016    sleeve resection / terracina     Past Surgical History:   Procedure Laterality Date    HX GI  1/2016    sleeve resection / terracina    HX LAP CHOLECYSTECTOMY      HX ORTHOPAEDIC      torn labrum- right shoulder     Current Outpatient Prescriptions   Medication Sig Dispense Refill    levothyroxine (SYNTHROID) 112 mcg tablet Take 112 mcg by mouth Daily (before breakfast).  ergocalciferol (VITAMIN D2) 50,000 unit capsule Take 50,000 Units by mouth every seven (7) days.       LINACLOTIDE (LINZESS PO) Take 145 mcg by mouth daily.      citalopram (CELEXA) 20 mg tablet Take 40 mg by mouth daily.  FERROUS SULFATE, DRIED (IRON, DRIED, PO) Take 325 mg by mouth two (2) times a day.  cyanocobalamin (VITAMIN B-12) 1,000 mcg sublingual tablet Take 1,000 mcg by mouth daily.  multivitamin with iron (FLINTSTONES) chewable tablet Take 2 Tabs by mouth daily.           Review of Symptoms:       General - No history or complaints of unexpected fever or chills  Head/Neck - No history or complaints of headache or dizziness  Cardiac - No history or complaints of chest pain, palpitations, or shortness of breath  Pulmonary - No history or complaints of shortness of breath or productive cough  Gastrointestinal - as noted above  Genitourinary - No history or complaints of hematuria/dysuria or renal lithiasis  Musculoskeletal - No history or complaints of joint  muscular weakness  Hematologic - No history of any bleeding episodes  Neurologic - No history or complaints of  migraine headaches or neurologic symptoms                     Objective:     Visit Vitals    /64 (BP 1 Location: Left arm, BP Patient Position: Sitting)    Pulse 60    Resp 16    Ht 5' 2\" (1.575 m)    Wt 73.9 kg (163 lb)    SpO2 100%    BMI 29.81 kg/m2        Physical Exam:    General:  alert, cooperative, no distress, appears stated age   Lungs:   clear to auscultation bilaterally   Heart:  Regular rate and rhythm   Abdomen:   abdomen is soft without significant tenderness, masses, organomegaly or guarding; rash noted under pannus   Incisions: healing well, no hernias         Lab Results   Component Value Date/Time    WBC 5.7 04/18/2017 01:03 PM    HGB 10.8 04/18/2017 01:03 PM    HCT 35.4 04/18/2017 01:03 PM    PLATELET 565 68/61/7493 01:03 PM    MCV 72.4 04/18/2017 01:03 PM     Lab Results   Component Value Date/Time    Sodium 145 03/04/2017 12:32 PM    Potassium 3.8 03/04/2017 12:32 PM    Chloride 109 03/04/2017 12:32 PM    CO2 28 03/04/2017 12:32 PM    Anion gap 8 03/04/2017 12:32 PM    Glucose 70 03/04/2017 12:32 PM    BUN 15 03/04/2017 12:32 PM    Creatinine 0.65 03/04/2017 12:32 PM    BUN/Creatinine ratio 23 03/04/2017 12:32 PM    GFR est AA >60 03/04/2017 12:32 PM    GFR est non-AA >60 03/04/2017 12:32 PM    Calcium 8.7 03/04/2017 12:32 PM    Bilirubin, total 0.3 03/04/2017 12:32 PM    AST (SGOT) 14 03/04/2017 12:32 PM    Alk. phosphatase 52 03/04/2017 12:32 PM    Protein, total 7.1 03/04/2017 12:32 PM    Albumin 3.4 03/04/2017 12:32 PM    Globulin 3.7 03/04/2017 12:32 PM    A-G Ratio 0.9 03/04/2017 12:32 PM    ALT (SGPT) 34 03/04/2017 12:32 PM     Lab Results   Component Value Date/Time    Iron 15 02/01/2017 11:47 AM    Ferritin 44 04/18/2017 01:03 PM     Lab Results   Component Value Date/Time    Folate >20.0 02/01/2017 11:47 AM     Lab Results   Component Value Date/Time    Vitamin D 25-Hydroxy 31.9 04/18/2017 01:04 PM             Assessment:     1. History of Morbid obesity, status post  laparoscopic sleeve gastrectomy. Doing well; no concerns. .     Plan:     1. Remember to measure portions, continue low carbohydrate diet  2. Continue to concentrate on protein intake meeting daily requirements  3. Remember vitamin supplements. The importance of such was discussed regarding the malabsorptive issues that the surgery creates. 4. Exercise regimen appears to be: poor but doing well  5. Try and attend support group if feasible. 6. Follow-up in 6 month(s). 7. Lab ordered for next visit  8. Total time spent with the patient 30 minutes.   9. Rash under pannus so will treat with Nystatin

## 2018-05-08 ENCOUNTER — HOSPITAL ENCOUNTER (OUTPATIENT)
Dept: LAB | Age: 43
Discharge: HOME | End: 2018-05-08
Payer: COMMERCIAL

## 2018-05-08 LAB
25(OH)D3 SERPL-MCNC: 21.1 NG/ML (ref 30–100)
BASOPHILS # BLD: 0 K/UL (ref 0–0.06)
BASOPHILS NFR BLD: 1 % (ref 0–2)
DIFFERENTIAL METHOD BLD: ABNORMAL
EOSINOPHIL # BLD: 0.1 K/UL (ref 0–0.4)
EOSINOPHIL NFR BLD: 1 % (ref 0–5)
ERYTHROCYTE [DISTWIDTH] IN BLOOD BY AUTOMATED COUNT: 16.4 % (ref 11.6–14.5)
HCT VFR BLD AUTO: 32 % (ref 35–45)
HGB BLD-MCNC: 9.6 G/DL (ref 12–16)
LYMPHOCYTES # BLD: 2.2 K/UL (ref 0.9–3.6)
LYMPHOCYTES NFR BLD: 43 % (ref 21–52)
MCH RBC QN AUTO: 21.4 PG (ref 24–34)
MCHC RBC AUTO-ENTMCNC: 30 G/DL (ref 31–37)
MCV RBC AUTO: 71.4 FL (ref 74–97)
MONOCYTES # BLD: 0.3 K/UL (ref 0.05–1.2)
MONOCYTES NFR BLD: 6 % (ref 3–10)
NEUTS SEG # BLD: 2.5 K/UL (ref 1.8–8)
NEUTS SEG NFR BLD: 49 % (ref 40–73)
PLATELET # BLD AUTO: 184 K/UL (ref 135–420)
RBC # BLD AUTO: 4.48 M/UL (ref 4.2–5.3)
T4 FREE SERPL-MCNC: 0.8 NG/DL (ref 0.7–1.5)
TSH SERPL DL<=0.05 MIU/L-ACNC: 3.68 UIU/ML (ref 0.36–3.74)
WBC # BLD AUTO: 5 K/UL (ref 4.6–13.2)

## 2018-05-08 PROCEDURE — 82306 VITAMIN D 25 HYDROXY: CPT | Performed by: NURSE PRACTITIONER

## 2018-05-08 PROCEDURE — 85025 COMPLETE CBC W/AUTO DIFF WBC: CPT | Performed by: NURSE PRACTITIONER

## 2018-05-08 PROCEDURE — 84439 ASSAY OF FREE THYROXINE: CPT | Performed by: NURSE PRACTITIONER

## 2018-05-08 PROCEDURE — 84443 ASSAY THYROID STIM HORMONE: CPT | Performed by: NURSE PRACTITIONER

## 2018-05-08 PROCEDURE — 36415 COLL VENOUS BLD VENIPUNCTURE: CPT | Performed by: NURSE PRACTITIONER

## 2018-05-09 LAB
FAX TO INFO,FAXT: NORMAL
FAX TO NUMBER,FAXN: NORMAL

## 2018-12-20 ENCOUNTER — DOCUMENTATION ONLY (OUTPATIENT)
Dept: SURGERY | Age: 43
End: 2018-12-20

## 2018-12-20 NOTE — LETTER
[facility] 
 
 
Dear Patient, Your health is our main concern. It is important for your health to have follow-up lab work and to see you surgeon at 2 months, 4 months, 6 months, 9 months and annually after your weight loss surgery. Additionally, the Department of Bariatric Surgery at our hospital is a member of the [employer] [address] Surgical Quality Improvement Program ([facility] NSQIP). As a participant in this program, we gather information on the outcomes of our patients after surgery. Please call the office for a follow up appointment at [phone]. If you have moved out of the area or have changed surgeons please call us and let us know the name of your doctor. Your health and feedback are important to us. We greatly appreciate your response. Thank you, [facility]

## 2018-12-20 NOTE — PROGRESS NOTES
Per [facility] requirements;  E-mail and letter sent for follow up appointment. [facility] [place]  [facility]  [facility]      Dear Patient,    Your health is our main concern. It is important for your health to have follow-up lab work and to see you surgeon at 2 months, 4 months, 6 months, 9 months and annually after your weight loss surgery. Additionally, the Department of Bariatric Surgery at our hospital is a member of the [employer] [address] Surgical Quality Improvement Program ([facility] NSQIP). As a participant in this program, we gather information on the outcomes of our patients after surgery. Please call the office for a follow up appointment at [phone]. If you have moved out of the area or have changed surgeons please call us and let us know the name of your doctor. Your health and feedback are important to us. We greatly appreciate your response.        Thank you,  [facility] [facility]

## 2019-06-06 ENCOUNTER — HOSPITAL ENCOUNTER (EMERGENCY)
Age: 44
Discharge: HOME | End: 2019-06-06
Attending: EMERGENCY MEDICINE
Payer: COMMERCIAL

## 2019-06-06 VITALS
HEIGHT: 62 IN | TEMPERATURE: 99.2 F | BODY MASS INDEX: 42.69 KG/M2 | DIASTOLIC BLOOD PRESSURE: 72 MMHG | RESPIRATION RATE: 16 BRPM | WEIGHT: 232 LBS | SYSTOLIC BLOOD PRESSURE: 143 MMHG | HEART RATE: 86 BPM | OXYGEN SATURATION: 100 %

## 2019-06-06 DIAGNOSIS — J00 ACUTE NASOPHARYNGITIS: ICD-10-CM

## 2019-06-06 DIAGNOSIS — L40.9 PSORIASIS: Primary | ICD-10-CM

## 2019-06-06 PROCEDURE — 99282 EMERGENCY DEPT VISIT SF MDM: CPT

## 2019-06-06 RX ORDER — CLOBETASOL PROPIONATE 0.5 MG/G
CREAM TOPICAL 2 TIMES DAILY
Qty: 30 G | Refills: 2 | Status: SHIPPED | OUTPATIENT
Start: 2019-06-06 | End: 2020-07-29 | Stop reason: ALTCHOICE

## 2019-06-06 RX ORDER — PREDNISONE 10 MG/1
TABLET ORAL
Qty: 32 TAB | Refills: 0 | Status: SHIPPED | OUTPATIENT
Start: 2019-06-06 | End: 2020-07-29 | Stop reason: ALTCHOICE

## 2019-06-06 RX ORDER — ESCITALOPRAM OXALATE 20 MG/1
20 TABLET ORAL DAILY
COMMUNITY
End: 2020-07-29 | Stop reason: ALTCHOICE

## 2019-06-07 NOTE — ED TRIAGE NOTES
Patient ambulatory into ER c/o right knee psoriasis skin problem. Pt reports infection to the area approx 2 weeks ago and finished antibiotics June 1st.  Wound to the right knee began to have clear drainage and pain increased yesterday.

## 2019-06-07 NOTE — DISCHARGE INSTRUCTIONS
Patient Education        Psoriasis: Care Instructions  Your Care Instructions  Psoriasis (say \"unw-RU-kn-rhiannon\") is a long-term skin problem that causes thick, white, silvery, or red patches on the skin. The patches may be small or large, and they occur most often on the knees, elbows, scalp, hands, feet, or lower back. The skin may be scaly. If the condition is severe, your skin can become itchy and tender. Psoriasis also can be embarrassing if the patches are on visible areas. You can treat psoriasis with good care at home and with medicine from your doctor. You may put medicine on your skin and take pills or have shots to stop the redness and swelling. Your doctor also may suggest ultraviolet light treatments. Follow-up care is a key part of your treatment and safety. Be sure to make and go to all appointments, and call your doctor if you are having problems. It's also a good idea to know your test results and keep a list of the medicines you take. How can you care for yourself at home? · If your doctor prescribes medicine, use it exactly as prescribed. Follow your doctor's advice for sunlight or ultraviolet light treatment. Call your doctor if you think you are having a problem with your medicine. · Protect your skin:  ? Keep your skin moist. After bathing, put an ointment, cream, or lotion on your skin while it is still damp. This seals in moisture. Use over-the-counter products that your doctor suggests. These may include Cetaphil, Lubriderm, or Eucerin. Petroleum jelly (such as Vaseline) and vegetable shortening (such as Crisco) also work. ? If you have psoriasis on your scalp, use a shampoo with salicylic acid, such as Neutrogena T/Sal.  ? Avoid harsh skin products, such as those that contain alcohol. ? Cover your skin in cold weather. ? Try to prevent sunburn. Although short periods of sun exposure reduce psoriasis in most people, too much sun can damage the skin and cause skin cancer.  In addition, sunburns can trigger psoriasis. Use sunscreen on areas of your skin that do not have psoriasis. Make sure to use a broad-spectrum sunscreen that has a sun protection factor (SPF) of 30 or higher. Use it every day, even when it is cloudy. ? Take care to avoid accidents such as cutting or scraping your skin. An injury to the skin can cause psoriasis patches to form anywhere on the body, including the area of the injury. ? Avoid tight shoes, clothing, watchbands, and hats. These may irritate your skin. ? Use a vaporizer or humidifier to add moisture to your bedroom. Follow the directions for cleaning the machine. · Try making one or more changes to your daily habits to help with managing your psoriasis. For example:  ? Try to control stress and anxiety. They may cause psoriasis to appear suddenly or can make symptoms worse. ? If you smoke, think about quitting. If you need help quitting, talk to your doctor about stop-smoking programs and medicines. ? If you drink, limit or reduce the amount of alcohol you drink. ? If you are overweight, see if you can lose some weight. · Seek support from family and friends. Talk to a counselor or other professional if you feel sad about your condition and need more help. When should you call for help? Call your doctor now or seek immediate medical care if:    · You have signs of infection, such as:  ? Increased pain, swelling, warmth, or redness. ? Red streaks leading from the area. ? Pus draining from the area. ? A fever.    Watch closely for changes in your health, and be sure to contact your doctor if:    · You have swelling, stiffness, or pain in your joints.     · You do not get better as expected. Where can you learn more? Go to http://christopher-vitor.info/. Enter F008 in the search box to learn more about \"Psoriasis: Care Instructions. \"  Current as of: April 17, 2018  Content Version: 11.9  © 4859-1352 [employer], [facility].  Care instructions adapted under license by [employer] (which disclaims liability or warranty for this information). If you have questions about a medical condition or this instruction, always ask your healthcare professional. [address] any warranty or liability for your use of this information. Patient Education        Upper Respiratory Infection (Cold): Care Instructions  Your Care Instructions    An upper respiratory infection, or URI, is an infection of the nose, sinuses, or throat. URIs are spread by coughs, sneezes, and direct contact. The common cold is the most frequent kind of URI. The flu and sinus infections are other kinds of URIs. Almost all URIs are caused by viruses. Antibiotics won't cure them. But you can treat most infections with home care. This may include drinking lots of fluids and taking over-the-counter pain medicine. You will probably feel better in 4 to 10 days. The doctor has checked you carefully, but problems can develop later. If you notice any problems or new symptoms, get medical treatment right away. Follow-up care is a key part of your treatment and safety. Be sure to make and go to all appointments, and call your doctor if you are having problems. It's also a good idea to know your test results and keep a list of the medicines you take. How can you care for yourself at home? · To prevent dehydration, drink plenty of fluids, enough so that your urine is light yellow or clear like water. Choose water and other caffeine-free clear liquids until you feel better. If you have kidney, heart, or liver disease and have to limit fluids, talk with your doctor before you increase the amount of fluids you drink. · Take an over-the-counter pain medicine, such as acetaminophen (Tylenol), ibuprofen (Advil, Motrin), or naproxen (Aleve). Read and follow all instructions on the label. · Before you use cough and cold medicines, check the label.  These medicines may not be safe for young children or for people with certain health problems. · Be careful when taking over-the-counter cold or flu medicines and Tylenol at the same time. Many of these medicines have acetaminophen, which is Tylenol. Read the labels to make sure that you are not taking more than the recommended dose. Too much acetaminophen (Tylenol) can be harmful. · Get plenty of rest.  · Do not smoke or allow others to smoke around you. If you need help quitting, talk to your doctor about stop-smoking programs and medicines. These can increase your chances of quitting for good. When should you call for help? Call 911 anytime you think you may need emergency care. For example, call if:    · You have severe trouble breathing.    Call your doctor now or seek immediate medical care if:    · You seem to be getting much sicker.     · You have new or worse trouble breathing.     · You have a new or higher fever.     · You have a new rash.    Watch closely for changes in your health, and be sure to contact your doctor if:    · You have a new symptom, such as a sore throat, an earache, or sinus pain.     · You cough more deeply or more often, especially if you notice more mucus or a change in the color of your mucus.     · You do not get better as expected. Where can you learn more? Go to http://christopher-vitor.info/. Enter M418 in the search box to learn more about \"Upper Respiratory Infection (Cold): Care Instructions. \"  Current as of: September 5, 2018  Content Version: 11.9  © 7272-7672 [employer]. Care instructions adapted under license by [employer] (which disclaims liability or warranty for this information). If you have questions about a medical condition or this instruction, always ask your healthcare professional. [address] any warranty or liability for your use of this information.          Patient Education        Saline Nasal Washes: Care Instructions  Your Care Instructions  Saline nasal washes help keep the nasal passages open by washing out thick or dried mucus. This simple remedy can help relieve symptoms of allergies, sinusitis, and colds. It also can make the nose feel more comfortable by keeping the mucous membranes moist. You may notice a little burning sensation in your nose the first few times you use the solution, but this usually gets better in a few days. Follow-up care is a key part of your treatment and safety. Be sure to make and go to all appointments, and call your doctor if you are having problems. It's also a good idea to know your test results and keep a list of the medicines you take. How can you care for yourself at home? · You can buy premixed saline solution in a squeeze bottle or other sinus rinse products at a drugstore. Read and follow the instructions on the label. · You also can make your own saline solution by adding 1 teaspoon of salt and 1 teaspoon of baking soda to 2 cups of distilled water. · If you use a homemade solution, pour a small amount into a clean bowl. Using a rubber bulb syringe, squeeze the syringe and place the tip in the salt water. Pull a small amount of the salt water into the syringe by relaxing your hand. · Sit down with your head tilted slightly back. Do not lie down. Put the tip of the bulb syringe or the squeeze bottle a little way into one of your nostrils. Gently drip or squirt a few drops into the nostril. Repeat with the other nostril. Some sneezing and gagging are normal at first.  · Gently blow your nose. · Wipe the syringe or bottle tip clean after each use. · Repeat this 2 or 3 times a day. · Use nasal washes gently if you have nosebleeds often. When should you call for help? Watch closely for changes in your health, and be sure to contact your doctor if:    · You often get nosebleeds.     · You have problems doing the nasal washes. Where can you learn more?   Go to http://nelda.info/. Enter 071 [id number] in the search box to learn more about \"Saline Nasal Washes: Care Instructions. \"  Current as of: March 27, 2018  Content Version: 11.9  © 4460-6027 [employer]. Care instructions adapted under license by [employer] (which disclaims liability or warranty for this information). If you have questions about a medical condition or this instruction, always ask your healthcare professional. [place] [address] any warranty or liability for your use of this information.

## 2019-06-07 NOTE — ED PROVIDER NOTES
EMERGENCY DEPARTMENT HISTORY AND PHYSICAL EXAM    Date: 6/6/2019  Patient Name: Beltran Wing    History of Presenting Illness     Chief Complaint   Patient presents with    Skin Problem       History Provided By: Patient    Additional History (Context):   Beltran Wing is a 45 y.o. female who presents emergency room with complaints of right knee psoriasis with possible infection. Patient was seen in a local urgent care center here recently and placed on oral antibiotics as well as prednisone. Unfortunately, the antibiotic gave her a yeast infection and what she says is urinary tract infection. Initially treated with Keflex and prednisone. Switched over to [facility] for the UTI. She does note on higher doses of the steroid initially her right knee psoriasis got better. However inflammation seem to come right back. Now weeping. Also complains of some discomfort to the area with extension down into the calf. Also complaining of cold symptoms since the weekend. Increased nasal congestion drainage. Some sinus pressure and pain. Positive postnasal drip. No ear pain. Afebrile. PCP: Nitesh Valles NP    Current Outpatient Medications   Medication Sig Dispense Refill    escitalopram oxalate (LEXAPRO) 20 mg tablet Take 20 mg by mouth daily.  predniSONE (DELTASONE) 10 mg tablet Tapering dose over 12 days  8-5-0-4-3-3-2-2-1-1-1-1  Take in the morning with food  Indications: psoriasis 32 Tab 0    clobetasol (TEMOVATE) 0.05 % topical cream Apply  to affected area two (2) times a day. 30 g 2    levothyroxine (SYNTHROID) 112 mcg tablet Take 112 mcg by mouth Daily (before breakfast).  ergocalciferol (VITAMIN D2) 50,000 unit capsule Take 50,000 Units by mouth every seven (7) days.  FERROUS SULFATE, DRIED (IRON, DRIED, PO) Take 325 mg by mouth two (2) times a day.  multivitamin with iron (FLINTSTONES) chewable tablet Take 2 Tabs by mouth daily.       nystatin (MYCOSTATIN) topical cream Apply  to affected area two (2) times a day. 15 g 2    LINACLOTIDE (LINZESS PO) Take 145 mcg by mouth daily.  citalopram (CELEXA) 20 mg tablet Take 40 mg by mouth daily.  cyanocobalamin (VITAMIN B-12) 1,000 mcg sublingual tablet Take 1,000 mcg by mouth daily. Past History     Past Medical History:  Past Medical History:   Diagnosis Date    Abdominal pain     Anemia     Anxiety     GERD (gastroesophageal reflux disease)     Hypercholesterolemia     Hypothyroid     Intestinal malabsorption     Psoriasis     Psoriatic arthritis ([address]     Severe obesity ([address]     Status post bariatric surgery 1/2016    sleeve resection / terracina       Past Surgical History:  Past Surgical History:   Procedure Laterality Date    HX GI  1/2016    sleeve resection / terracina    HX LAP CHOLECYSTECTOMY      HX ORTHOPAEDIC      torn labrum- right shoulder       Family History:  Family History   Problem Relation Age of Onset    Diabetes Mother     Hypertension Mother     Elevated Lipids Father     Hypertension Father        Social History:  Social History     Tobacco Use    Smoking status: Never Smoker    Smokeless tobacco: Never Used   Substance Use Topics    Alcohol use: No     Alcohol/week: 0.0 oz    Drug use: No       Allergies: Allergies   Allergen Reactions    Other Food Other (comments)     Bananas and soy per allergy testing    Bactrim [Sulfamethoprim] Rash and Other (comments)    Peanut Other (comments)     Per allergy testing    Vicodin [Hydrocodone-Acetaminophen] Swelling         Review of Systems   Review of Systems   Constitutional: Negative for chills, fatigue and fever. HENT: Positive for congestion, postnasal drip, sinus pressure and sinus pain. Negative for ear pain, rhinorrhea, sneezing and sore throat. Skin: Positive for wound. Psoriatic change right knee possible infection   All other systems reviewed and are negative.       Physical Exam     Vitals: 06/06/19 2124   BP: 143/72   Pulse: 86   Resp: 16   Temp: 99.2 °F (37.3 °C)   SpO2: 100%   Weight: 105.2 kg (232 lb)   Height: 5' 2\" (1.575 m)     Physical Exam   Constitutional: She is oriented to person, place, and time. Vital signs are normal. She is cooperative. Heavyset female no apparent distress. Vital signs are stable. HENT:   Right Ear: Tympanic membrane normal.   Left Ear: Tympanic membrane normal.   Nose: Mucosal edema present. No rhinorrhea. Right sinus exhibits maxillary sinus tenderness and frontal sinus tenderness. Left sinus exhibits maxillary sinus tenderness and frontal sinus tenderness. Mouth/Throat: Uvula is midline, oropharynx is clear and moist and mucous membranes are normal.   Eyes: Pupils are equal, round, and reactive to light. Conjunctivae and EOM are normal.   Neck: Neck supple. Cardiovascular: Normal rate and regular rhythm. Pulmonary/Chest: Effort normal and breath sounds normal.   Lymphadenopathy:     She has no cervical adenopathy. Neurological: She is alert and oriented to person, place, and time. Skin: Skin is warm and dry. Right knee -obese patch of psoriatic changes noted to the anterior knee. Large patch well demarcated. There is no evidence of any surrounding cellulitis. No induration. The area appears moist (from patient keeping it covered). Some clear drainage from the wound area. No purulent drainage. Psychiatric: She has a normal mood and affect. Nursing note and vitals reviewed. Nursing note and vitals reviewed         Diagnostic Study Results     Labs -   No results found for this or any previous visit (from the past 12 hour(s)). Radiologic Studies   No orders to display     CT Results  (Last 48 hours)    None        CXR Results  (Last 48 hours)    None            Medical Decision Making   I am the first provider for this patient.     I reviewed the vital signs, available nursing notes, past medical history, past surgical history, family history and social history. Vital Signs-Reviewed the patient's vital signs. Records Reviewed: Nursing Notes    DDX: Psoriatic inflammatory reaction right knee. No evidence of infection. URI    Provider Notes:   45 y.o. female with history of chronic psoriatic issues presents with right knee psoriasis. Been treated for infection here recently by local urgent care center. Here on examination, there is no evidence of cellulitis. However the area is inflamed. There is also some moisture associated skin issues related to the wound. She will not be treated with antibiotics. She will be placed on oral steroid taper as well as topical steroid cream.  Advised her that she probably needs to see PCP and possibly get a referral to dermatology for chronic treatment of her psoriasis. Also having URI symptoms. Advised patient no antibiotics. Recommended symptomatic treatment. Discharged home. Procedures:  Procedures    ED Course:   Initial assessment performed. The patients presenting problems have been discussed, and they are in agreement with the care plan formulated and outlined with them. I have encouraged them to ask questions as they arise throughout their visit. Diagnosis and Disposition       DISCHARGE NOTE:  [address]  results have been reviewed with her. She has been counseled regarding her diagnosis, treatment, and plan. She verbally conveys understanding and agreement of the signs, symptoms, diagnosis, treatment and prognosis and additionally agrees to follow up as discussed. She also agrees with the care-plan and conveys that all of her questions have been answered. I have also provided discharge instructions for her that include: educational information regarding their diagnosis and treatment, and list of reasons why they would want to return to the ED prior to their follow-up appointment, should her condition change.  She has been provided with education for proper emergency department utilization. CLINICAL IMPRESSION:    1. Psoriasis    2. Acute nasopharyngitis        PLAN:  1. D/C Home  2. Current Discharge Medication List      START taking these medications    Details   predniSONE (DELTASONE) 10 mg tablet Tapering dose over 12 days  9-9-9-4-3-3-2-2-1-1-1-1  Take in the morning with food  Indications: psoriasis  Qty: 32 Tab, Refills: 0      clobetasol (TEMOVATE) 0.05 % topical cream Apply  to affected area two (2) times a day. Qty: 30 g, Refills: 2           3. Follow-up Information     Follow up With Specialties Details Why Contact Info    Idris Barrios NP Nurse Practitioner Call PCP for follow-up. Possibly referral to dermatology for further treatment of psoriasis [address] [place] [id number]  [phone]      [facility] EMERGENCY DEPT Emergency Medicine  If symptoms worsen [address] [place] [address]  [phone]        ____________________________________     Please note that this dictation was completed with [employer], the computer voice recognition software. Quite often unanticipated grammatical, syntax, homophones, and other interpretive errors are inadvertently transcribed by the computer software. Please disregard these errors. Please excuse any errors that have escaped final proofreading.

## 2019-12-13 ENCOUNTER — DOCUMENTATION ONLY (OUTPATIENT)
Dept: SURGERY | Age: 44
End: 2019-12-13

## 2019-12-13 NOTE — PROGRESS NOTES
Per [facility] requirements;  E-mail and letter sent for follow up appointment. [facility] [place] [facility] [place]  [facility]  [facility]      Dear Patient,    Your health is our main concern. It is important for your health to have follow-up lab work and to see you surgeon at 2 months, 4 months, 6 months, 9 months and annually after your weight loss surgery. Additionally, the Department of Bariatric Surgery at our hospital is a member of the [employer] [address] Surgical Quality Improvement Program ([facility] NSQIP). As a participant in this program, we gather information on the outcomes of our patients after surgery. Please call the office for a follow up appointment at [phone]. If you have moved out of the area or have changed surgeons please call us and let us know the name of your doctor. Your health and feedback are important to us. We greatly appreciate your response.        Thank you,  [facility] [place] [facility]

## 2019-12-13 NOTE — LETTER
[facility] [place] [facility] 
 
 
Dear Patient, Your health is our main concern. It is important for your health to have follow-up lab work and to see you surgeon at 2 months, 4 months, 6 months, 9 months and annually after your weight loss surgery. Additionally, the Department of Bariatric Surgery at our hospital is a member of the [employer] [address] Surgical Quality Improvement Program ([facility] NSQIP). As a participant in this program, we gather information on the outcomes of our patients after surgery. Please call the office for a follow up appointment at [phone]. If you have moved out of the area or have changed surgeons please call us and let us know the name of your doctor. Your health and feedback are important to us. We greatly appreciate your response. Thank you, [facility]

## 2020-07-29 ENCOUNTER — VIRTUAL VISIT (OUTPATIENT)
Dept: SURGERY | Age: 45
End: 2020-07-29

## 2020-07-29 VITALS — BODY MASS INDEX: 44.16 KG/M2 | HEIGHT: 62 IN | WEIGHT: 240 LBS

## 2020-07-29 DIAGNOSIS — K21.9 GASTROESOPHAGEAL REFLUX DISEASE, ESOPHAGITIS PRESENCE NOT SPECIFIED: ICD-10-CM

## 2020-07-29 DIAGNOSIS — E03.1 CONGENITAL HYPOTHYROIDISM WITHOUT GOITER: ICD-10-CM

## 2020-07-29 DIAGNOSIS — E55.9 HYPOVITAMINOSIS D: ICD-10-CM

## 2020-07-29 DIAGNOSIS — K90.9 INTESTINAL MALABSORPTION, UNSPECIFIED TYPE: Primary | ICD-10-CM

## 2020-07-29 DIAGNOSIS — D50.0 IRON DEFICIENCY ANEMIA DUE TO CHRONIC BLOOD LOSS: ICD-10-CM

## 2020-07-29 DIAGNOSIS — Z98.84 STATUS POST BARIATRIC SURGERY: ICD-10-CM

## 2020-07-29 RX ORDER — BUPROPION HYDROCHLORIDE 100 MG/1
TABLET, FILM COATED ORAL
COMMUNITY
End: 2020-12-23

## 2020-07-29 RX ORDER — FAMOTIDINE 20 MG/1
20 TABLET, FILM COATED ORAL 2 TIMES DAILY
Qty: 60 TAB | Refills: 2 | Status: SHIPPED | OUTPATIENT
Start: 2020-07-29

## 2020-07-29 RX ORDER — OMEPRAZOLE 20 MG/1
20 CAPSULE, DELAYED RELEASE ORAL
Qty: 60 CAP | Refills: 2 | Status: SHIPPED | OUTPATIENT
Start: 2020-07-29 | End: 2020-10-27

## 2020-07-29 RX ORDER — HYDROCHLOROTHIAZIDE 12.5 MG/1
12.5 CAPSULE ORAL DAILY
COMMUNITY

## 2020-07-29 NOTE — PATIENT INSTRUCTIONS
Patient Instructions 1. Remember hydration goals - minimum of 64 ounces of liquids per day (dehydration is the number one reason for hospital readmission). 2. Continue to monitor carbohydrate and protein intake you need a minimum of  Grams of protein daily- remember to keep your total carbohydrates to 50 grams or less per day for best results. 3. Continue to work towards exercise goals - 60-90 minutes, 5 times a week minimum of deliberate, aerobic exercise is the ultimate goal with strength training 2 times each week. Refer to [employer] for  information. 4. Remember to take vitamins as directed. 5. Attend support group the 2nd Thursday of each month. 6.  Constipation: Milk of Magnesia is for immediate relief only. Miralax is to be used every day if constipation is a chronic problem. 7.  Diarrhea: patients will occasionally develop lactose intolerance after surgery. Check to see if your protein shake has whey in it. If it does try a protein powder or drink that does not have whey and stop all yogurts, cheeses and milks to see if the diarrhea goes away. 8.  If you have had labs drawn. We will only call you if you have abnormal results. Otherwise you can access the lab results in \"mychart\". You will only need the access code the first time you sign on. 9.  Call us at [phone] or email us through [email or web address] with questions,     concerns or worsening of condition, we have someone on call 24 hours a day. If you are unable to reach our office, you are to go to your Primary Care Physician or the Emergency Department. NOTE TO GASTRIC BYPASS PATIENTS:  (SAME APPLIES TO GASTRIC SLEEVE PATIENTS FOR FIRST TWO MONTHS) Remember that for the rest of your life, you are not able to take the following: 
- NSAIDs (ibuprofen, goody powder, BC powder, Motrin, Advil, Mobic, Voltaren, Excedrin, etc.) - Steroid pills or injections - Smoke (cigarettes or recreational drugs) - Alcohol Use of any of the above may cause ulcers in your stomach which may perforate causing a medical emergency and surgery. Speak to our medical staff if another medical provider requires you to take steroids or NSAIDs. Supplement Resource Guide Importance of Protein:  
Maintains lean body mass, produces antibodies to fight off infections, heals wounds, minimizes hair loss, helps to give you energy, helps with satiety, and keeping you full between meals. Importance of Calcium: 
Needed for healthy bones and teeth, normal blood clotting, and nervous system functioning, higher risk of osteoporosis and bone disease with non-compliance. Importance of Multivitamins: Many functions. Supply you with extra nutrients that you may be missing from food. May lead to iron deficiency anemia, weakness, fatigue, and many other symptoms with non-compliance. Importance of B Vitamins: 
Important for red blood cell formation, metabolism, energy, and helps to maintain a healthy nervous system. Protein Supplement Find one you like now. Use immediately after surgery. Look for: 
35-50g protein each day from your protein supplement once you reach the progression diet. 0-3 g fat per serving 0-3 g sugar per serving Protein drinks should be split in separate dosages. Recommend: Lifelong 1 year + Calcium Supplement:  
 
Start taking within a month after surgery. Look for: Calcium Citrate Plus D (1500 mg per day) Recommend: Citracal 
 
 . Avoid chocolate chewable calcium. Can use chewable bariatric or [employer] brand or similar chewable. The body cannot absorb more than 500-600 mg of calcium at a time. Take for Life Multi-vitamin Supplement:   
 
Start immediately after surgery: any complete chewable, such as: [place]s Complete chewables. Avoid Savage sours or gummies. They lack iron and other important nutrients and also have added sugar. Continue with chewable vitamin or change to adult complete multivitamin one month after surgery. Menstruating women can take a prenatal vitamin. Make sure has at least 18 mg iron and 846-576 mcg folic acid Vitamin B12, B Complex Vitamin, and Biotin Start taking within a month after surgery. Vitamin B12:  1000 mcg of Vitamin B12 three times weekly Must take sublingually (meaning you take it under your tongue) or in a liquid drop form for easy absorption. B Complex Vitamin: Take a pill or liquid drop form once daily. Biotin: This vitamin can help prevent hair loss. Recommend 5mg  
(5000 mcg) a day Biotin is Optional  
 
 
 
 
  
Learning About Physical Activity What is physical activity? Physical activity is any kind of activity that gets your body moving. The types of physical activity that can help you get fit and stay healthy include: · Aerobic or \"cardio\" activities that make your heart beat faster and make you breathe harder, such as brisk walking, riding a bike, or running. Aerobic activities strengthen your heart and lungs and build up your endurance. · Strength training activities that make your muscles work against, or \"resist,\" something, such as lifting weights or doing push-ups. These activities help tone and strengthen your muscles. · Stretches that allow you to move your joints and muscles through their full range of motion. Stretching helps you be more flexible and avoid injury. What are the benefits of physical activity? Being active is one of the best things you can do for your health. It helps you to: · Feel stronger and have more energy to do all the things you like to do. · Focus better at school or work. · Feel, think, and sleep better. · Reach and stay at a healthy weight. · Lose fat and build lean muscle. · Lower your risk for serious health problems. · Keep your bones, muscles, and joints strong. How can you make physical activity part of your life? Get at least 30 minutes of exercise on most days of the week. Walking is a good choice. You also may want to do other activities, such as running, swimming, cycling, or playing tennis or team sports. Pick activities that you likeones that make your heart beat faster, your muscles stronger, and your muscles and joints more flexible. If you find more than one thing you like doing, do them all. You don't have to do the same thing every day. Get your heart pumping every day. Any activity that makes your heart beat faster and keeps it at that rate for a while counts. Here are some great ways to get your heart beating faster: · Go for a brisk walk, run, or bike ride. · Go for a hike or swim. · Go in-line skating. · Play a game of touch football, basketball, or soccer. · Ride a bike. · Play tennis or racquetball. · Climb stairs. Even some household chores can be aerobicjust do them at a faster pace. Vacuuming, raking or mowing the lawn, sweeping the garage, and washing and waxing the car all can help get your heart rate up. Strengthen your muscles during the week. You don't have to lift heavy weights or grow big, bulky muscles to get stronger. Doing a few simple activities that make your muscles work against, or \"resist,\" something can help you get stronger. For example, you can: · Do push-ups or sit-ups, which use your own body weight as resistance. · Lift weights or dumbbells or use stretch bands at home or in a gym or community center. Stretch your muscles often. Stretching will help you as you become more active. It can help you stay flexible, loosen tight muscles, and avoid injury. It can also help improve your balance and posture and can be a great way to relax. Be sure to stretch the muscles you'll be using when you work out.  It's best to warm your muscles slightly before you stretch them. Walk or do some other light aerobic activity for a few minutes, and then start stretching. When you stretch your muscles: · Do it slowly. Stretching is not about going fast or making sudden movements. · Don't push or bounce during a stretch. · Hold each stretch for at least 15 to 30 seconds, if you can. You should feel a stretch in the muscle, but not pain. · Breathe out as you do the stretch. Then breathe in as you hold the stretch. Don't hold your breath. If you're worried about how more activity might affect your health, have a checkup before you start. Follow any special advice your doctor gives you for getting a smart start. Where can you learn more? Go to http://www.gray.com/ Enter S824 in the search box to learn more about \"Learning About Physical Activity. \" Current as of: January 16, 2020               Content Version: 12.5 © 2006-2020 [employer]. Care instructions adapted under license by [employer] (which disclaims liability or warranty for this information). If you have questions about a medical condition or this instruction, always ask your healthcare professional. [address] any warranty or liability for your use of this information.

## 2020-07-29 NOTE — PROGRESS NOTES
Subjective:     Charleston Bernheim  is a 39 y.o. female who presents for follow-up about 4.5  years following laparoscopic sleeve gastrectomy. She has lost a total of 31 pounds since surgery. Body mass index is 43.9 kg/m². . EBWL is (21%). The patient presents today to assess their progress toward their goal of weight loss and to address any issues that may be present. Today the patient and I have reviewed their diet and how appropriate their food choices are. The following issues have been identified - dysphagia and regurgitation with eating or drinking anything and weight regain of 77 lbs since last visit. Of note, patient last seen here at her 18 month postop visit. At that time she had lost 108 lbs, 75% EBWL. Noticed her weight gain after being placed on OCP for heavy menses. Has been off the pill for 6+ months and still no weight loss. Surgery related complication: NA       She reports severe reflux and vomiting with almost any liquid or solid food, has been worsening over the past 2 years. Has been using TUMS to try and control without success. She denies abdominal pain, diarrhea and nausea. The patients diet choices have been reviewed today and counseling was given. Fluid intake: not tracking, but low      Protein intake: try to get a protein shake for breakfast, but often not tolerating, can occ eat chicken for dinner    Patients pain score:0/10    The patient's exercise level: not active. Changes in her medical history and medications have been reviewed. Has stopped her vitamins except B12.     Comorbidities:    Hypertension: not applicable  Diabetes: not applicable  Obstructive Sleep Apnea: not applicable  Hyperlipidemia: not applicable  Stress Urinary Incontinence: not applicable  Gastroesophageal Reflux: worsened  Weight related arthropathy:not applicable    Patient Active Problem List   Diagnosis Code    Psoriasis L40.9    Hypothyroid E03.9    Anemia D64.9    Psoriatic arthritis ([facility] [address] L40.50    Anxiety F41.9    Intestinal malabsorption K90.9    Status post bariatric surgery Z98.84     Past Medical History:   Diagnosis Date    Abdominal pain     Anemia     Anxiety     GERD (gastroesophageal reflux disease)     Hypercholesterolemia     Hypothyroid     Intestinal malabsorption     Psoriasis     Psoriatic arthritis (HCC)     Severe obesity ([address]     Status post bariatric surgery 1/2016    sleeve resection / terracina     Past Surgical History:   Procedure Laterality Date    HX GI  1/2016    sleeve resection / terracina    HX LAP CHOLECYSTECTOMY      HX ORTHOPAEDIC      torn labrum- right shoulder     Current Outpatient Medications   Medication Sig Dispense Refill    buPROPion SR (Wellbutrin SR) 100 mg SR tablet Take  by mouth.  hydroCHLOROthiazide (MICROZIDE) 12.5 mg capsule Take 12.5 mg by mouth daily.  levothyroxine (SYNTHROID) 112 mcg tablet Take 112 mcg by mouth Daily (before breakfast).  citalopram (CELEXA) 20 mg tablet Take 40 mg by mouth daily.  cyanocobalamin (VITAMIN B-12) 1,000 mcg sublingual tablet Take 1,000 mcg by mouth daily.  escitalopram oxalate (LEXAPRO) 20 mg tablet Take 20 mg by mouth daily.  predniSONE (DELTASONE) 10 mg tablet Tapering dose over 12 days  6-3-3-4-3-3-2-2-1-1-1-1  Take in the morning with food  Indications: psoriasis 32 Tab 0    clobetasol (TEMOVATE) 0.05 % topical cream Apply  to affected area two (2) times a day. 30 g 2    nystatin (MYCOSTATIN) topical cream Apply  to affected area two (2) times a day. 15 g 2    ergocalciferol (VITAMIN D2) 50,000 unit capsule Take 50,000 Units by mouth every seven (7) days.  LINACLOTIDE (LINZESS PO) Take 145 mcg by mouth daily.  FERROUS SULFATE, DRIED (IRON, DRIED, PO) Take 325 mg by mouth two (2) times a day.  multivitamin with iron (FLINTSTONES) chewable tablet Take 2 Tabs by mouth daily.           Review of Symptoms:       General - No history or complaints of unexpected fever or chills  Head/Neck - No history or complaints of headache or dizziness  Cardiac - No history or complaints of chest pain, palpitations, or shortness of breath  Pulmonary - No history or complaints of shortness of breath or productive cough  Gastrointestinal - as noted above  Genitourinary - No history or complaints of hematuria/dysuria or renal lithiasis  Musculoskeletal - No history or complaints of joint  muscular weakness  Hematologic - No history of any bleeding episodes  Neurologic - No history or complaints of  migraine headaches or neurologic symptoms                     Objective:     Visit Vitals  Ht 5' 2\" (1.575 m)   Wt 108.9 kg (240 lb)   BMI 43.90 kg/m²        Physical Exam:    General appearance - well appearing and in no distress  Mental status - alert, oriented to person, place, and time  Pulmonary - normal respiratory effort  Abdomen - no obvious distention  Neurological - normal speech, no focal findings or movement disorder noted  Extremities - normal movement  Musculoskeletal - moving extremities without difficulty  Skin - no rashes, no suspicious skin lesions noted       Lab Results   Component Value Date/Time    WBC 5.0 05/08/2018 11:13 AM    HGB 9.6 (L) 05/08/2018 11:13 AM    HCT 32.0 (L) 05/08/2018 11:13 AM    PLATELET 928 25/27/2859 11:13 AM    MCV 71.4 (L) 05/08/2018 11:13 AM     Lab Results   Component Value Date/Time    Sodium 145 03/04/2017 12:32 PM    Potassium 3.8 03/04/2017 12:32 PM    Chloride 109 (H) 03/04/2017 12:32 PM    CO2 28 03/04/2017 12:32 PM    Anion gap 8 03/04/2017 12:32 PM    Glucose 70 (L) 03/04/2017 12:32 PM    BUN 15 03/04/2017 12:32 PM    Creatinine 0.65 03/04/2017 12:32 PM    BUN/Creatinine ratio 23 (H) 03/04/2017 12:32 PM    GFR est AA >60 03/04/2017 12:32 PM    GFR est non-AA >60 03/04/2017 12:32 PM    Calcium 8.7 03/04/2017 12:32 PM    Bilirubin, total 0.3 03/04/2017 12:32 PM    Alk.  phosphatase 52 03/04/2017 12:32 PM    Protein, total 7.1 03/04/2017 12:32 PM    Albumin 3.4 03/04/2017 12:32 PM    Globulin 3.7 03/04/2017 12:32 PM    A-G Ratio 0.9 03/04/2017 12:32 PM    ALT (SGPT) 34 03/04/2017 12:32 PM     Lab Results   Component Value Date/Time    Iron 15 (L) 02/01/2017 11:47 AM    Ferritin 44 04/18/2017 01:03 PM     Lab Results   Component Value Date/Time    Folate >20.0 (H) 02/01/2017 11:47 AM     Lab Results   Component Value Date/Time    Vitamin D 25-Hydroxy 21.1 (L) 05/08/2018 11:14 AM           Assessment:     1. History of Morbid obesity, status post  laparoscopic sleeve gastrectomy. The patient is experiencing severe dysphagia and had significant weight gain following hormonal contraception. We did discuss the possibility of needing to consider conversion to gastric bypass and will proceed with UGI to evaluate anatomy and extent of reflux. In the meantime, will place her on BID PPI and BID H2 blocker and discussed other behavior modifications to help minimize reflux. Will check vitamins levels and talked about restarting other recommended vitamins. Do recommend she meet with our dietitian. Plan:     1. Remember to measure portions, continue low carbohydrate diet  2. Continue to concentrate on protein intake meeting daily requirements  3. Remember vitamin supplements. The importance of such was discussed regarding the malabsorptive issues that the surgery creates. 4. Exercise regimen appears to be: inadequate, increase cardio to 150 minutes weekly minimum  5. Try and attend support group if feasible. 6. Follow-up in 2 month(s). 7. Lab reviewed and appropriate changes made. Yearly labs ordered    This visit with Ms Walls Suri was performed under virtual telemedicine guidelines during the coronavirus (BBVPB-69) public health emergency on 7/29/20 in an interactive fashion using [employer] me. They understand that this telemedicine encounter is a billable service, with coverage determined by their insurance carrier.   They are aware that they may receive a bill and have provided verbal consent for this virtual visit. This visit was performed with the patient in their home environment and provider was present at [facility]. I have spent over 30 minutes on this visit  both prior to the visit reviewing the patients chart and with the patient face to face. I have reviewed their medical history, performed a telemedicine physical examination, and discussed the plan of action to date. They understand that they will be asked to come to the office when our office is allowed normal patient interaction, as dictated by public health officials, for a face-to-face visit to rediscuss all of the things we have talked about today. n/a

## 2020-09-02 ENCOUNTER — CLINICAL SUPPORT (OUTPATIENT)
Dept: SURGERY | Age: 45
End: 2020-09-02

## 2020-09-02 VITALS — HEIGHT: 62 IN | WEIGHT: 250 LBS | BODY MASS INDEX: 46.01 KG/M2

## 2020-09-02 DIAGNOSIS — Z98.84 STATUS POST BARIATRIC SURGERY: Primary | ICD-10-CM

## 2020-09-02 NOTE — PROGRESS NOTES
Medical Weight Loss Multi-Disciplinary Program    Name: Tessa Hernandez   : 1975    Session# 1-Revision   Date: 2020     Height: 5' 2\" (157.5 cm)    Weight: 113.4 kg (250 lb) lbs. Body mass index is 45.73 kg/m². Dietitian: Reid Reich is a 39 y.o. female who present for a pre-op evaluation. Visit Vitals  Ht 5' 2\" (1.575 m)   Wt 113.4 kg (250 lb)   BMI 45.73 kg/m²     Past Medical History:   Diagnosis Date    Abdominal pain     Anemia     Anxiety     GERD (gastroesophageal reflux disease)     Hypercholesterolemia     Hypothyroid     Intestinal malabsorption     Psoriasis     Psoriatic arthritis (HCC)     Severe obesity (HCC)     Status post bariatric surgery 2016    sleeve resection / terracina           Procedure:  Revision of laparoscopic sleeve gastrectomy to gastric bypass      Reasons for Surgery:  BMI > 40 with one or more medically significant comorbidities    Summary:  Pt given brief pre/post-op diet ed and diet hx reviewed. Pt instructed to follow a low calorie, low carbohydrate, high protein diet of about 7165-0592 calories daily. Pt set several goals. See below. Why do you think having weight loss surgery will make it possible for you to lose weight and keep it off? Patient reports due to severe GERD/dysphagia/regurrgitation she is considering revision     Dietary Instructions    Nutritional Hx: What is the number of meals you eat per day? 2-3, frequently skips breakfast    Do you eat between meals / snack? yes  Typical snack: frequently snacking late night on high calorie junk foods     How fast do you eat your meals? Rapid     How often do you eat fast food? 3 times a week    How many sodas/sugared beverages do you drink per day? Sweet tea -3-4 times per week     How many caffeinated drinks do you have per day? Sweet tea     How much milk and/or juice do you drink per day? Rarely    How much water do you drink per day?  10-12 (8oz glasses)    How often do you consume alcohol? 1-2 times per year     Current Vitamins: MVI, B12, Vitamin D, Magnesium    Diet History:    Typical intake is as follows:  Breakfast: SKIPS -3-4 days per week, may have protein shake (  Lunch: 1:00 pm - Fast food combo meal OR Lunchable   Dinner: 5:30-6:00 pm Ground beef with marinara over pasta noodles OR Hamburger steak with green beans   Snacks: Reports stress eating/comfort eating frequently at night ( Skinny pop popcorn, apples, grapes, chips, snack cake, candy bar)  Fluids: Water (5-6 bottles of water per day), Sweet tea - 3-4 times per week     Reviewed intake  Understanding low carbohydrates, low sugar, higher protein meals  Understanding proper portions  Dining outside home  Instruction given for personal dietary changes  Discussed perceived compliance  Comments: Pt given brief pre/post-op diet ed and diet hx reviewed. Patient Education and Materials Provided:  Supplement [facility], B Vitamin Information, MVI Recommendations, Calcium Citrate Information, Bariatric Supplement Companies, Protein Supplement Information, Fluid Requirements, No Caffeine or Carbonation, No Alcohol for One Year Post Op, 3 Balanced Meals a Day, Food Group Guide, Good Choices Dining Out, No Snacks, No Concentrated Sweets, Support System at Home, Exercising, Support Group Information and Addressed Current Habits / Changes to make  Physical Activity/Exercise    Discussed Perceived Compliance  Reasonable Goals Set  Motivation  Comments: none    Exercise:  Do you currently have an exercise routine? no- recently tour meniscus ~ 1 month ago     Goals:   1. Work to increase to 3-4 small meals per day, with planned snacks as needed. Recommend following plate method for meal planning - focusing on lean protein, non-starchy vegetables, and measured amounts of starch. - Goal of  g protein and 50-60 g carbohydrate per day.                - Recommend continuing protein supplement as meal replacement at least 1x/day  2. Increase non caloric fluid to 64 oz per day. Eliminate caffeine, added sugar, carbonation, and straws.               -Continue to work to decrease sugar sweetened beverages - goal of calorie free beverages only              -Must eliminate caffeine prior to surgery and avoid for ~6-8 weeks  3. Start activity regimen, work to increase ADL              -Try to start walking for at least 30-60  minutes 4-5 days per week  4. Continue all recommended vitamins/minerals     Candidate for surgery (per RD): PENDING   Diamond Delgado, POOL  09/02/20

## 2020-09-16 ENCOUNTER — APPOINTMENT (OUTPATIENT)
Dept: GENERAL RADIOLOGY | Age: 45
End: 2020-09-16
Attending: SPECIALIST
Payer: COMMERCIAL

## 2020-09-16 ENCOUNTER — HOSPITAL ENCOUNTER (OUTPATIENT)
Dept: LAB | Age: 45
Discharge: HOME | End: 2020-09-16
Payer: COMMERCIAL

## 2020-09-16 ENCOUNTER — HOSPITAL ENCOUNTER (OUTPATIENT)
Age: 45
Setting detail: OUTPATIENT SURGERY
Discharge: HOME | End: 2020-09-16
Attending: SPECIALIST | Admitting: SPECIALIST
Payer: COMMERCIAL

## 2020-09-16 VITALS
HEART RATE: 81 BPM | RESPIRATION RATE: 15 BRPM | OXYGEN SATURATION: 100 % | SYSTOLIC BLOOD PRESSURE: 131 MMHG | DIASTOLIC BLOOD PRESSURE: 80 MMHG | BODY MASS INDEX: 46.43 KG/M2 | HEIGHT: 62 IN | WEIGHT: 252.31 LBS | TEMPERATURE: 97.6 F

## 2020-09-16 DIAGNOSIS — D50.0 IRON DEFICIENCY ANEMIA DUE TO CHRONIC BLOOD LOSS: ICD-10-CM

## 2020-09-16 DIAGNOSIS — E55.9 HYPOVITAMINOSIS D: ICD-10-CM

## 2020-09-16 DIAGNOSIS — K90.9 INTESTINAL MALABSORPTION, UNSPECIFIED TYPE: ICD-10-CM

## 2020-09-16 DIAGNOSIS — E66.01 MORBID OBESITY (HCC): ICD-10-CM

## 2020-09-16 DIAGNOSIS — Z98.84 STATUS POST BARIATRIC SURGERY: ICD-10-CM

## 2020-09-16 DIAGNOSIS — K21.9 GASTROESOPHAGEAL REFLUX DISEASE, ESOPHAGITIS PRESENCE NOT SPECIFIED: ICD-10-CM

## 2020-09-16 DIAGNOSIS — E03.1 CONGENITAL HYPOTHYROIDISM WITHOUT GOITER: ICD-10-CM

## 2020-09-16 LAB
25(OH)D3 SERPL-MCNC: 19.5 NG/ML (ref 30–100)
ALBUMIN SERPL-MCNC: 3.5 G/DL (ref 3.4–5)
ALBUMIN/GLOB SERPL: 0.8 {RATIO} (ref 0.8–1.7)
ALP SERPL-CCNC: 75 U/L (ref 45–117)
ALT SERPL-CCNC: 17 U/L (ref 13–56)
ANION GAP SERPL CALC-SCNC: 7 MMOL/L (ref 3–18)
AST SERPL-CCNC: 9 U/L (ref 10–38)
BASOPHILS # BLD: 0 K/UL (ref 0–0.1)
BASOPHILS NFR BLD: 0 % (ref 0–2)
BILIRUB SERPL-MCNC: 0.3 MG/DL (ref 0.2–1)
BUN SERPL-MCNC: 18 MG/DL (ref 7–18)
BUN/CREAT SERPL: 27 (ref 12–20)
CALCIUM SERPL-MCNC: 9.2 MG/DL (ref 8.5–10.1)
CHLORIDE SERPL-SCNC: 108 MMOL/L (ref 100–111)
CO2 SERPL-SCNC: 26 MMOL/L (ref 21–32)
CREAT SERPL-MCNC: 0.67 MG/DL (ref 0.6–1.3)
DIFFERENTIAL METHOD BLD: ABNORMAL
EOSINOPHIL # BLD: 0.1 K/UL (ref 0–0.4)
EOSINOPHIL NFR BLD: 1 % (ref 0–5)
ERYTHROCYTE [DISTWIDTH] IN BLOOD BY AUTOMATED COUNT: 18.2 % (ref 11.6–14.5)
FERRITIN SERPL-MCNC: 14 NG/ML (ref 8–388)
FOLATE SERPL-MCNC: 17.5 NG/ML (ref 3.1–17.5)
GLOBULIN SER CALC-MCNC: 4.2 G/DL (ref 2–4)
GLUCOSE SERPL-MCNC: 79 MG/DL (ref 74–99)
HCT VFR BLD AUTO: 30.5 % (ref 35–45)
HGB BLD-MCNC: 8.9 G/DL (ref 12–16)
IRON SERPL-MCNC: 16 UG/DL (ref 50–175)
LYMPHOCYTES # BLD: 2 K/UL (ref 0.9–3.6)
LYMPHOCYTES NFR BLD: 27 % (ref 21–52)
MCH RBC QN AUTO: 19.7 PG (ref 24–34)
MCHC RBC AUTO-ENTMCNC: 29.2 G/DL (ref 31–37)
MCV RBC AUTO: 67.5 FL (ref 74–97)
MONOCYTES # BLD: 0.5 K/UL (ref 0.05–1.2)
MONOCYTES NFR BLD: 7 % (ref 3–10)
NEUTS SEG # BLD: 4.8 K/UL (ref 1.8–8)
NEUTS SEG NFR BLD: 65 % (ref 40–73)
PLATELET # BLD AUTO: 329 K/UL (ref 135–420)
PMV BLD AUTO: 10.3 FL (ref 9.2–11.8)
POTASSIUM SERPL-SCNC: 4.1 MMOL/L (ref 3.5–5.5)
PROT SERPL-MCNC: 7.7 G/DL (ref 6.4–8.2)
RBC # BLD AUTO: 4.52 M/UL (ref 4.2–5.3)
RBC MORPH BLD: ABNORMAL
SODIUM SERPL-SCNC: 141 MMOL/L (ref 136–145)
VIT B12 SERPL-MCNC: 1663 PG/ML (ref 211–911)
WBC # BLD AUTO: 7.4 K/UL (ref 4.6–13.2)

## 2020-09-16 PROCEDURE — 82306 VITAMIN D 25 HYDROXY: CPT

## 2020-09-16 PROCEDURE — 82728 ASSAY OF FERRITIN: CPT

## 2020-09-16 PROCEDURE — 74011000255 HC RX REV CODE- 255: Performed by: SPECIALIST

## 2020-09-16 PROCEDURE — 2709999900 HC NON-CHARGEABLE SUPPLY: Performed by: SPECIALIST

## 2020-09-16 PROCEDURE — 36415 COLL VENOUS BLD VENIPUNCTURE: CPT

## 2020-09-16 PROCEDURE — 84425 ASSAY OF VITAMIN B-1: CPT

## 2020-09-16 PROCEDURE — 82607 VITAMIN B-12: CPT

## 2020-09-16 PROCEDURE — 83540 ASSAY OF IRON: CPT

## 2020-09-16 PROCEDURE — 74240 X-RAY XM UPR GI TRC 1CNTRST: CPT

## 2020-09-16 PROCEDURE — 80053 COMPREHEN METABOLIC PANEL: CPT

## 2020-09-16 PROCEDURE — 85025 COMPLETE CBC W/AUTO DIFF WBC: CPT

## 2020-09-16 PROCEDURE — 76040000019: Performed by: SPECIALIST

## 2020-09-16 PROCEDURE — 77030040361 HC SLV COMPR DVT MDII -B: Performed by: SPECIALIST

## 2020-09-16 NOTE — PROCEDURES
4.5 years post sleeve with severe GERD after a 77 lbs weight regain . UGI is normal - plan to convert to gastric bypass.   See dictation

## 2020-09-18 NOTE — OP NOTES
[facility]  OPERATIVE REPORT    Name:  Tommie Mcarthur  MR#:   [id number]  :  1975  ACCOUNT #:  [de-identified]  DATE OF SERVICE:  2020    PREOPERATIVE DIAGNOSIS:  This patient is 4.5 years out from sleeve gastrectomy, with incomplete weight loss and severe reflux disease. POSTOPERATIVE DIAGNOSIS:  This patient is 4.5 years out from sleeve gastrectomy, with incomplete weight loss and severe reflux disease. PROCEDURE PERFORMED:  Upper GI study with barium. SURGEON:  Rhina Seay MD    ASSISTANT:  None. ANESTHESIA:  None. COMPLICATIONS:  None. SPECIMENS REMOVED:  None. IMPLANTS:  None. ESTIMATED BLOOD LOSS:  None. STATEMENT OF MEDICAL NECESSITY:  The patient is a 26-year-old female, who had a sleeve gastrectomy four and a half years ago. She had incomplete weight loss with the procedure, and has since developed severe reflux symptoms intractable to proton pump inhibitor therapy. She is here today for upper GI study to assess her gastric sleeve for any issues. PROCEDURE:  The patient was brought to the fluoro unit where she was given thin barium. On swallowing barium, she was noted to have normal peristalsis of her esophagus. She had filling of the distal esophagus with tapering into and through the gastroesophageal junction. Contrast then filled the proximal, mid, and distal sleeve, all which was normal in caliber and dimension. Contrast then filled the duodenum in a normal fashion. There were several episodes of reflux during the procedure, but the sleeve itself was totally normal.  Based on her incomplete weight loss and her persistent morbid obesity, in addition to her severe reflux symptoms, she is a candidate for conversion to the gastric bypass.     Uriel Peralta MD      [email or web address]  D:  2020 9:31  T:  2020 11:42  JOB #:  [id number]

## 2020-09-21 ENCOUNTER — VIRTUAL VISIT (OUTPATIENT)
Dept: SURGERY | Age: 45
End: 2020-09-21

## 2020-09-21 VITALS — HEIGHT: 62 IN | WEIGHT: 250 LBS | BODY MASS INDEX: 46.01 KG/M2

## 2020-09-21 DIAGNOSIS — D50.0 IRON DEFICIENCY ANEMIA DUE TO CHRONIC BLOOD LOSS: ICD-10-CM

## 2020-09-21 DIAGNOSIS — Z98.84 STATUS POST BARIATRIC SURGERY: ICD-10-CM

## 2020-09-21 DIAGNOSIS — K90.9 INTESTINAL MALABSORPTION, UNSPECIFIED TYPE: ICD-10-CM

## 2020-09-21 DIAGNOSIS — L40.50 PSORIATIC ARTHRITIS (HCC): ICD-10-CM

## 2020-09-21 DIAGNOSIS — E66.01 MORBID OBESITY (HCC): Primary | ICD-10-CM

## 2020-09-21 DIAGNOSIS — K21.9 GASTROESOPHAGEAL REFLUX DISEASE, ESOPHAGITIS PRESENCE NOT SPECIFIED: ICD-10-CM

## 2020-09-21 DIAGNOSIS — E03.1 CONGENITAL HYPOTHYROIDISM WITHOUT GOITER: ICD-10-CM

## 2020-09-21 LAB — VIT B1 BLD-SCNC: 92.5 NMOL/L (ref 66.5–200)

## 2020-09-21 NOTE — PROGRESS NOTES
Pt has a virtual appt with Dr Eduardo Pal this morning. Pt stated that she wants to discuss a revision.

## 2020-09-21 NOTE — PROGRESS NOTES
Revision Surgery Consultation    Subjective: The patient is a 39 y.o. obese female with a Body mass index is 45.73 kg/m². .  The patient had a sleeve procedure done approximatly 5 years ago in [facility] by me.  her starting weight prior to surgery was 271.  she ultimately lost approximately 108 lbs with a subsequent weight regain of 77lbs. her last bariatric follow-up was 1 year ago with me. Анна Noemí notes that she had no issues in the immediate post-op phase and had no hospital readmissions in the remote post-op phase. she currently is having the following issues related to his health:sever GERD and regurgitation. she is here today to discuss a possible revision of her sleeve because of GERD and regurgitation. All of their prior evaluations available by both their PCP's and specialists physicians have been reviewed today either in the Care Everywhere portal or scanned under the media tab. I have spent a large portion of my initial consultation today reviewing the patients current dietary habits which have contributed to their health issues, weight regain and  their current obesity. They understand that generally speaking,  weight regain is  a function of resuming less that ideal dietary habits instead of being a procedural issue. They understand that older procedures are more likely to be associated with a less that perfect procedural result, such as a prior vertical banded gastroplasty or non divided gastric bypass. These procedures are more likely to result in staple line failures with resultant weight regain. This has been explained to the patient via diagrams of these older procedures and given to the patient. I have suggested to them personally a dietary regimen that they can initiate now to help with their status as it pertains to their weight.   They understand that the most important aspect of their journey through their weight loss endeavor will be their adherence to a new lifestyle of healthy eating behavior. They also understand that an adherence to an exercise program will not only help with weight loss but is ultimately important in weight maintenance. Patient Active Problem List    Diagnosis Date Noted    Morbid obesity with BMI of 45.0-49.9, adult ([facility]     Regurgitant esophagitis     Hypovitaminosis D 07/29/2020    Intestinal malabsorption 01/26/2016    Status post bariatric surgery 01/01/2016    Psoriasis     Hypothyroid     Anemia     P[facility]iatic arthritis ([facility]     GERD (gastroesophageal reflux disease)     Anxiety       Past Surgical History:   Procedure Laterality Date    HX GI  1/2016    sleeve resection / terracina    HX LAP CHOLECYSTECTOMY      HX ORTHOPAEDIC      torn labrum- right shoulder      Social History     Tobacco Use    Smoking status: Never Smoker    Smokeless tobacco: Never Used   Substance Use Topics    Alcohol use: No     Alcohol/week: 0.0 standard drinks      Family History   Problem Relation Age of Onset    Diabetes Mother     Hypertension Mother     Elevated Lipids Father     Hypertension Father       Current Outpatient Medications   Medication Sig Dispense Refill    buPROPion SR (Wellbutrin SR) 100 mg SR tablet Take  by mouth.  hydroCHLOROthiazide (MICROZIDE) 12.5 mg capsule Take 12.5 mg by mouth daily.  omeprazole (PRILOSEC) 20 mg capsule Take 1 Cap by mouth Before breakfast and dinner for 90 days. Indications: gastroesophageal reflux disease 60 Cap 2    famotidine (PEPCID) 20 mg tablet Take 1 Tab by mouth two (2) times a day. Indications: gastroesophageal reflux disease 60 Tab 2    levothyroxine (SYNTHROID) 112 mcg tablet Take 112 mcg by mouth Daily (before breakfast).  cyanocobalamin (VITAMIN B-12) 1,000 mcg sublingual tablet Take 1,000 mcg by mouth daily.        Allergies   Allergen Reactions    Other Food Other (comments)     Bananas and soy per allergy testing  Fish- swelling    Bactrim [Sulfamethoprim] Rash and Other (comments)    Peanut Other (comments)     Per allergy testing    Vicodin [Hydrocodone-Acetaminophen] Swelling          Review of Systems:            General - No history or complaints of unexpected fever, chills, or weight loss  Head/Neck - No history or complaints of headache, diplopia, dysphagia, hearing loss  Cardiac - No history or complaints of chest pain, palpitations, murmur, or shortness of breath  Pulmonary - No history or complaints of shortness of breath, productive cough, hemoptysis  Gastrointestinal - No history or complaints of reflux,  abdominal pain, obstipation/constipation, blood per rectum  Genitourinary - No history or complaints of hematuria/dysuria, stress urinary incontinence symptoms, or renal lithiasis  Musculoskeletal - No history or complaints of joint pain or muscular weakness  Hematologic - No history or complaints of bleeding disorders, blood transfusions, sickle cell anemia  Neurologic - No history or complaints of  migraine headaches, seizure activity, syncopal episodes, TIA or stroke  Integumentary - No history or complaints of rashes, abnormal nevi, skin cancer  Gynecological - no change           Objective:     Visit Vitals  Ht 5' 2\" (1.575 m)   Wt 113.4 kg (250 lb)   BMI 45.73 kg/m²       Physical Examination:       Physical Examination: General appearance - alert, well appearing, and in no distress and oriented to person, place, and time  Mental status - alert, oriented to person, place, and time, normal mood, behavior, speech, dress, motor activity, and thought processes  Eyes - pupils equal and reactive, extraocular eye movements intact, sclera anicteric, left eye normal, right eye normal  Ears - right ear normal, left ear normal  Neck- good extension and flexion, no obvious swelling  Chest - good air movement  Heart - N/A  Abdomen - no obvious distension, scars as noted.   Neurological - alert, oriented, normal speech, no focal findings or movement disorder noted  Musculoskeletal - no swelling noted  Extremities - normal movement    Labs / Old Records: Old operative reports reviewed if available and are scanned under the media tab or reviewed under Care Everywhere        Assessment:     Morbid obesity status post sleeve gastrectomy procedure approximately 5 years ago with complaint of weight regain and sever reflux and regurgitation. Plan: 1. Weight regain-Today in our office I had a lengthy discussion with Nyla Kapadia regarding the nature of their prior procedure. We discussed the anatomical changes to their anatomy and how this relates to  contributing weight regain. Our office will continue to attempt to obtain any medical records, if not obtained or available already ,related to their procedure. It was also discussed today that before any decisions can be made regarding a possible revision of their initial  procedure that an upper GI swallow study must be obtained to evaluate their post surgical anatomy. They understand that in patients with a prior open gastric bypass, those patients are not revision candidates due to adhesive disease usually, and the fact that post surgical anatomy  usually can not be improved upon. They understand if their gastric bypass was performed laparoscopically, then this situation might be more amendable to gastric band placement over their prior gastric bypass. They understand, as I have explained today, that the adhesive disease associated with prior  procedures is at times a rate limiting factor. This precludes our ability to perform a revision procedure safely. The factors that contribute to this are increased risk such as age, health issues and increased risk from a procedural standpoint and have been discussed today. We will proceed with the UGI swallow study as described above.   The patient understands all of the above and wishes to proceed with the study. 2.Nutrition-  I have discussed in detail the pitfalls in diet that have contributed to their weight regain and the importance of adhering to a lifelong regimen of dietary goals and proper eating habits. I have discussed the proper lifelong bariatric diet  in detail spending in excess of 20 minutes discussing this. We will schedule them for a dietary consultation with our nutritionist and urge them to continue on a regular follow-up schedule with her. 3.Maintenance vitamins- Today we have discussed the importance of vitamins as it pertains to their procedure and we will obtain appropriate lab to check all levels. They have been provided a handout regarding this today. Total time spent with the patient reviewing their complex history of bariatric surgery,diet, and plan is in excess of 60 minutes. At this juncture the patient was found to be anemic with a hemoglobin of 8.9 on her recent lab work that was obtained. Her primary care physician has written for her to have an iron infusion and the patient will follow-up with her after that infusion is complete to have her lab work repeated. As I explained to her her preference would be for her to have her hemoglobin in the mid 12-13 range preoperatively    Secondary Diagnoses:         Signed By: Ben Irene MD     September 21, 2020         This visit with Jill Rosa was performed under virtual telemedicine guidelines during the coronavirus (AEOOR-79) public health emergency on 9/21/2020 in an interactive   fashion using [employer] me. They understand that this telemedicine encounter is a billable service, with coverage determined by their insurance carrier. They are aware that   they may receive a bill and have provided verbal consent for this virtual visit. This visit was performed with the patient in their home environment and provider was   present at [facility].  I have spent over 45 minutes on this visit  both prior to the visits reviewing the patients chart and with the patient face to face. I have reviewed their medical history, performed a telemedicine physical examination, and discussed the plan of action to date.        Reena Peñaloza M.D.  9/21/2020

## 2020-09-22 ENCOUNTER — TELEPHONE (OUTPATIENT)
Dept: SURGERY | Age: 45
End: 2020-09-22

## 2020-09-22 ENCOUNTER — CLINICAL SUPPORT (OUTPATIENT)
Dept: SURGERY | Age: 45
End: 2020-09-22

## 2020-09-22 VITALS — BODY MASS INDEX: 46.01 KG/M2 | WEIGHT: 250 LBS | HEIGHT: 62 IN

## 2020-09-22 DIAGNOSIS — E66.01 MORBID OBESITY (HCC): Primary | ICD-10-CM

## 2020-09-22 RX ORDER — ERGOCALCIFEROL 1.25 MG/1
50000 CAPSULE ORAL 2 TIMES WEEKLY
Qty: 26 CAP | Refills: 1 | Status: SHIPPED | OUTPATIENT
Start: 2020-09-25 | End: 2021-03-23

## 2020-09-22 NOTE — PROGRESS NOTES
Medical Weight Loss Multi-Disciplinary Program    Name: Chidi Villalobos   : 1975    Session# 2  Date: 2020    Visit Vitals  Ht 5' 2\" (1.575 m)   Wt 113.4 kg (250 lb)   BMI 45.73 kg/m²     Dietary Instructions    Reviewed intake  Understanding low carbohydrates, low sugar, higher protein meals  Instruction given for personal dietary changes  Discussed perceived compliance  Comments: Diet hx reviewed and personal dietary changes discussed. Reviewed recommendation to follow [id number] calorie diet, working to reduce total carbohydrate intake to  g or less per day and increasing protein intake to  g per day, compared current intake to recommendations. Today we reviewed patient homework packet for Mindful vs Mindless eating. Patient completed Mindful eating quiz and listed ways she could practice mindful eating. Behavior Modification    Identify obstacles to trigger change  Achieving/Rewarding goals met  Positive attitude  Comments: Reinforced importance continuing to modify lifestyle patterns and behaviors to promote weight loss and long term weight maintenance       Goals:   1. Work to increase to 3-4 small meals per day, with planned snacks as needed. Recommend following plate method for meal planning - focusing on lean protein, non-starchy vegetables, and measured amounts of starch. - Goal of  g protein and  g carbohydrate per day. - Recommend continuing protein supplement as meal replacement at least 1x/day OR as high protein snack option  2. Increase non caloric fluid to 64 oz per day. Eliminate caffeine, added sugar, carbonation, and straws.               -Continue to work to decrease sugar sweetened beverages - goal of calorie free beverages only              -Must eliminate caffeine prior to surgery and avoid for ~6-8 weeks   -Practice 30:30 rule,  food and flood   3.  Start activity regimen, work to increase ADL  4. Start Complete MVI    Candidate for surgery (per RD):  PENDING    Amber Vazquez

## 2020-09-22 NOTE — TELEPHONE ENCOUNTER
Spoke with patient about lab results. PCP has placed referral for iron infusion.  Will send Rx for vitamin D.

## 2020-11-10 DIAGNOSIS — A08.11 EPIDEMIC VOMITING SYNDROME: ICD-10-CM

## 2020-11-10 DIAGNOSIS — Z98.84 BARIATRIC SURGERY STATUS: ICD-10-CM

## 2020-11-10 DIAGNOSIS — K21.9 GASTROESOPHAGEAL REFLUX DISEASE WITHOUT ESOPHAGITIS: ICD-10-CM

## 2020-11-10 DIAGNOSIS — R13.10 PROBLEMS WITH SWALLOWING AND MASTICATION: Primary | ICD-10-CM

## 2020-11-10 DIAGNOSIS — Z01.812 BLOOD TESTS PRIOR TO TREATMENT OR PROCEDURE: ICD-10-CM

## 2020-12-07 DIAGNOSIS — Z01.812 BLOOD TESTS PRIOR TO TREATMENT OR PROCEDURE: ICD-10-CM

## 2020-12-07 DIAGNOSIS — R13.10 PROBLEMS WITH SWALLOWING AND MASTICATION: ICD-10-CM

## 2020-12-07 DIAGNOSIS — Z98.84 BARIATRIC SURGERY STATUS: ICD-10-CM

## 2020-12-07 DIAGNOSIS — K21.9 GASTROESOPHAGEAL REFLUX DISEASE WITHOUT ESOPHAGITIS: ICD-10-CM

## 2020-12-07 DIAGNOSIS — A08.11 EPIDEMIC VOMITING SYNDROME: ICD-10-CM

## 2020-12-14 ENCOUNTER — TELEPHONE (OUTPATIENT)
Dept: SURGERY | Age: 45
End: 2020-12-14

## 2020-12-14 ENCOUNTER — OFFICE VISIT (OUTPATIENT)
Dept: SURGERY | Age: 45
End: 2020-12-14
Payer: COMMERCIAL

## 2020-12-14 VITALS
DIASTOLIC BLOOD PRESSURE: 76 MMHG | HEIGHT: 62 IN | WEIGHT: 238.5 LBS | RESPIRATION RATE: 16 BRPM | HEART RATE: 76 BPM | BODY MASS INDEX: 43.89 KG/M2 | OXYGEN SATURATION: 99 % | SYSTOLIC BLOOD PRESSURE: 143 MMHG

## 2020-12-14 DIAGNOSIS — E03.1 CONGENITAL HYPOTHYROIDISM WITHOUT GOITER: ICD-10-CM

## 2020-12-14 DIAGNOSIS — L40.50 PSORIATIC ARTHRITIS (HCC): ICD-10-CM

## 2020-12-14 DIAGNOSIS — E66.01 MORBID OBESITY (HCC): Primary | ICD-10-CM

## 2020-12-14 DIAGNOSIS — E66.01 MORBID OBESITY WITH BODY MASS INDEX (BMI) OF 40.0 TO 49.9 (HCC): ICD-10-CM

## 2020-12-14 DIAGNOSIS — L40.9 PSORIASIS: ICD-10-CM

## 2020-12-14 DIAGNOSIS — K21.00 REGURGITANT ESOPHAGITIS: ICD-10-CM

## 2020-12-14 DIAGNOSIS — D50.0 IRON DEFICIENCY ANEMIA DUE TO CHRONIC BLOOD LOSS: ICD-10-CM

## 2020-12-14 DIAGNOSIS — K90.9 INTESTINAL MALABSORPTION, UNSPECIFIED TYPE: ICD-10-CM

## 2020-12-14 DIAGNOSIS — K21.9 GASTROESOPHAGEAL REFLUX DISEASE, UNSPECIFIED WHETHER ESOPHAGITIS PRESENT: ICD-10-CM

## 2020-12-14 PROCEDURE — 99215 OFFICE O/P EST HI 40 MIN: CPT | Performed by: SPECIALIST

## 2020-12-14 RX ORDER — LORAZEPAM 0.5 MG/1
0.5 TABLET ORAL
COMMUNITY
Start: 2020-12-08

## 2020-12-14 NOTE — PATIENT INSTRUCTIONS
Preparation for Surgery Refer to your book for specific instructions 1. Stop taking all aspirin products, ibuprofen products, non-steroidal medications, blood thinners,  and herbal supplements as outlined in your book. 2. Absolutely no smoking. 3. If diabetic, monitor blood sugars regularly and alert the office of blood sugars over 200. 
 
4. Have a supply of protein product and liquid diet items for your first two weeks as outlined in your book. 5. The day before your surgery is scheduled: 
6.  
? Gastric Bypass and Sleeve:  Clear liquids and Protein Shakes ? Gastric Band:   Eat lightly. No snacking. ? Drink lots of water 6. Get prepared to meet a new you!

## 2020-12-14 NOTE — PROGRESS NOTES
Sleeve to Gastric Bypass Conversion - History and Physical    Subjective: The patient is a 39 y.o. obese female with a Body mass index is 43.62 kg/m². .   she presents now to review their work up to date to see if they are a candidate for surgery and whether or not to proceed with the previously requested procedure. Our sleeve resection from Jan 2021. Bariatric comorbidities continue to include:   Patient Active Problem List   Diagnosis Code    Psoriasis L40.9    Hypothyroid E03.9    Anemia D64.9    [facility] ([facility] L40.50    GERD (gastroesophageal reflux disease) K21.9    Anxiety F41.9    Intestinal malabsorption K90.9    Status post bariatric surgery Z98.84    Hypovitaminosis D E55.9    Morbid obesity with BMI of 45.0-49.9, [facility] E66.01, Z68.42    Regurgitant esophagitis K21.00    Morbid obesity ([facility] E66.01    Morbid obesity with body mass index (BMI) of 40.0 to 49.9 ([facility]) E66.01       They have been generally well prior to this visit and have had no recent significant illnesses. The patient has had no gastrointestinal issues that would preclude them from proceeding with the surgery they have chosen. Maida Quigley has recently tried a preoperative weight loss program  in addition to seeing a bariatric nutritionist preoperatively. We have discussed on at least one other occasion about the various types of surgical weight loss procedures and they have considered these options after our initial consultation. We have once again discussed these procedures in detail and they have now decided on a surgical procedure. They present today to discuss this and confirm that their evaluation pre operatively is acceptable to continue with surgery. The patient desires laparoscopic gastric bypass surgery for surgical weight loss. The patients goal weight is 147 lb. (this represents a BMI of 27)    These goals are consistent with expected outcomes of their desired operation.   her Medical goals are resolution of these health issues. Patient Active Problem List    Diagnosis Date Noted    Morbid obesity ([facility]     Morbid obesity with body mass index (BMI) of 40.0 to 49.9 ([facility]     Morbid obesity with BMI of 45.0-49.9, adult ([facility]     Regurgitant esophagitis     Hypovitaminosis D 07/29/2020    Intestinal malabsorption 01/26/2016    Status post bariatric surgery 01/01/2016    Psoriasis     Hypothyroid     Anemia     Psoriatic arthritis ([facility])     GERD (gastroesophageal reflux disease)     Anxiety       Past Surgical History:   Procedure Laterality Date    HX GI  1/2016    sleeve resection / terracina    HX LAP CHOLECYSTECTOMY      HX ORTHOPAEDIC      torn labrum- right shoulder      Social History     Tobacco Use    Smoking status: Never Smoker    Smokeless tobacco: Never Used   Substance Use Topics    Alcohol use: No     Alcohol/week: 0.0 standard drinks      Family History   Problem Relation Age of Onset    Diabetes Mother     Hypertension Mother     Elevated Lipids Father     Hypertension Father       Current Outpatient Medications   Medication Sig Dispense Refill    LORazepam (ATIVAN) 0.5 mg tablet TAKE 1/2 TO 1 TABLET BY MOUTH UP TO TWICE DAILY AS NEEDED FOR ANXIETY      therapeutic multivitamin-minerals (THERAGRAN-M) tablet Take 1 Tab by mouth daily.  ergocalciferol (Vitamin D2) 1,250 mcg (50,000 unit) capsule Take 1 Cap by mouth two (2) times a week for 52 doses. Indications: vitamin D deficiency (high dose therapy) 26 Cap 1    buPROPion SR (Wellbutrin SR) 100 mg SR tablet Take  by mouth.  hydroCHLOROthiazide (MICROZIDE) 12.5 mg capsule Take 12.5 mg by mouth daily.  famotidine (PEPCID) 20 mg tablet Take 1 Tab by mouth two (2) times a day. Indications: gastroesophageal reflux disease 60 Tab 2    levothyroxine (SYNTHROID) 112 mcg tablet Take 112 mcg by mouth Daily (before breakfast).       cyanocobalamin (VITAMIN B-12) 1,000 mcg sublingual tablet Take 1,000 mcg by mouth daily.        Allergies   Allergen Reactions    Other Food Other (comments)     Bananas and soy per allergy testing  Fish- swelling    Bactrim [Sulfamethoprim] Rash and Other (comments)    Peanut Other (comments)     Per allergy testing    Vicodin [Hydrocodone-Acetaminophen] Swelling        Review of Systems:        General - No history or complaints of unexpected fever, chills, or weight loss  Head/Neck - No history or complaints of headache, diplopia, dysphagia, hearing loss  Cardiac - No history or complaints of chest pain, palpitations, murmur, or shortness of breath  Pulmonary - No history or complaints of shortness of breath, productive cough, hemoptysis  Gastrointestinal - (+) history of reflux, No history or complaints of abdominal pain, obstipation/constipation, blood per rectum  Genitourinary - No history or complaints of hematuria/dysuria, stress urinary incontinence symptoms, or renal lithiasis  Musculoskeletal - No history or complaints of joint pain or muscular weakness  Hematologic - No history or complaints of bleeding disorders, blood transfusions, sickle cell anemia  Neurologic - No history or complaints of  migraine headaches, seizure activity, syncopal episodes, TIA or stroke  Integumentary - No history or complaints of rashes, abnormal nevi, skin cancer  Gynecological - unremarkable    Objective:     Visit Vitals  BP (!) 143/76 (BP 1 Location: Left arm, BP Patient Position: Sitting)   Pulse 76   Resp 16   Ht 5' 2\" (1.575 m)   Wt 108.2 kg (238 lb 8 oz)   SpO2 99%   BMI 43.62 kg/m²       Physical Examination: General appearance - alert, well appearing, and in no distress  Mental status - alert, oriented to person, place, and time  Eyes - pupils equal and reactive, extraocular eye movements intact  Ears - bilateral TM's and external ear canals normal  Nose - normal and patent, no erythema, discharge or polyps  Mouth - mucous membranes moist, pharynx normal without lesions  Neck - supple, no significant adenopathy  Lymphatics - no palpable lymphadenopathy, no hepatosplenomegaly  Chest - clear to auscultation, no wheezes, rales or rhonchi, symmetric air entry  Heart - normal rate, regular rhythm, normal S1, S2, no murmurs, rubs, clicks or gallops  Abdomen - soft, nontender, nondistended, no masses or organomegaly  Back exam - full range of motion, no tenderness, palpable spasm or pain on motion  Neurological - alert, oriented, normal speech, no focal findings or movement disorder noted  Musculoskeletal - no joint tenderness, deformity or swelling  Extremities - peripheral pulses normal, no pedal edema, no clubbing or cyanosis  Skin - normal coloration and turgor, no rashes, no suspicious skin lesions noted    Labs / Preoperative Evaluations:     No results found for this or any previous visit (from the past 1008 hour(s)). Assessment:     Morbid obesity with associated comorbidity    Plan:     conversion of sleeve to gastric bypass    This is a 39 y.o. female with a BMI of Body mass index is 43.62 kg/m². and the weight-related co-morbidties as noted above. Phoebe Valles meets the NIH criteria for bariatric surgery based upon the BMI of Body mass index is 43.62 kg/m². and multiple weight-related co-morbidties. Phoebe Valles has elected laparoscopic gastric bypass as her intervention of choice for treatment of morbid obestiy through surgical means secondary to its uniform results,  profound baseline suppression of hunger and pace at which weight is lost.    In the office today, following Sandy's history and physical examination, a 40 minute discussion regarding the anatomic alterations for the laparoscopic gastric bypass  was undertaken. The dietary expectations and the patient  dependent factors for success were thoroughly discussed, to include the need for interval follow-up and long-term dietary changes associated with success.  The possible short and long term  complications of the gastric bypass were also discussed, to include but not limited to;death, DVT/PE, staple line leak, bleeding, stricture formation, infection,internal hernia  and pouch dilation. Specific weight related outcomes for success were also discussed with an emphasis on careful and close follow-up with the first year and Dietary behavior modification over the first years as baseline cyclical hunger returns  The patient expressed an understanding of the above factors, and her questions were answered in their entirety. In addition, the patient attended a 1.5 hour power point seminar regarding obesity, surgical weight loss including, adjustable gastric band, gastric bypass, and sleeve gastrectomy. This discussion contrasted the different surgical techniques, mechanisms of actions and expected outcomes, and surgical and medical risks associated with each procedure. During this seminar, there was a long question and answer session where each questions was answered until there were no additional questions. Today, the patient had all of her questions answered and the decision was made today that the patient's preoperative evaluation is acceptable for them  to proceed with bariatric surgery  choosing adjustable gastric bypass as her surgical option. The patient understands the plan of action    She understands we will only proceed with conversion under optimal circumstances    Secondary Diagnoses:     DVT / Pulmonary Embolus Risk - The patient is at a higher risk for post operative DVT / pulmonary embolus secondary to their morbid obese status, relative sedentary lifestyle, and impending general anesthetic. We will plan to use anticoagulation therapy pre and post operative as well as  pneumatic compression devices and encourage ambulation once on the hospital nursing floor.   The need for possible at home anticoagulation therapy has also been discussed and any decision on this matter will be made during post operative evaluations. The patient understands that their efforts at ambulation are of vital importance to reduce the risk of this complication thus placing significant burden on them as to the prevention of such issues. Signs and symptoms of DVT / PE have been discussed with the patient and they have been instructed to call the office if any these occur in the \"at home\" post op phase. Hyperlipidemia - The patient understands that studies show that almost all patient will realize an improvement in their lipid profile with weight loss that occurs with these procedures. They however also understand that hyperlipidemia is a multifactorial disease particularly as it pertains to their genetic background and that there is no guarantee toward cure  of this issue. We will resume their medications immediately postoperatively as this tends to decrease any post operative cardiac events.  The patient will follow up with their family physician in the postoperative period with plans to repeat their lipid panel 2-3 month postoperative for potential adjustment or removal of these medications.     Signed By: Inga Lee MD     December 14, 2020

## 2020-12-21 ENCOUNTER — TELEPHONE (OUTPATIENT)
Dept: SURGERY | Age: 45
End: 2020-12-21

## 2020-12-21 RX ORDER — ENOXAPARIN SODIUM 100 MG/ML
40 INJECTION SUBCUTANEOUS EVERY 12 HOURS
Qty: 14 SYRINGE | Refills: 0 | Status: SHIPPED | OUTPATIENT
Start: 2020-12-21 | End: 2020-12-23

## 2020-12-31 ENCOUNTER — HOSPITAL ENCOUNTER (OUTPATIENT)
Dept: PREADMISSION TESTING | Age: 45
Discharge: HOME | End: 2020-12-31
Payer: COMMERCIAL

## 2020-12-31 LAB
ABO + RH BLD: NORMAL
ANION GAP SERPL CALC-SCNC: 4 MMOL/L (ref 3–18)
BLOOD GROUP ANTIBODIES SERPL: NORMAL
BUN SERPL-MCNC: 14 MG/DL (ref 7–18)
BUN/CREAT SERPL: 20 (ref 12–20)
CALCIUM SERPL-MCNC: 9.1 MG/DL (ref 8.5–10.1)
CHLORIDE SERPL-SCNC: 109 MMOL/L (ref 100–111)
CO2 SERPL-SCNC: 29 MMOL/L (ref 21–32)
CREAT SERPL-MCNC: 0.7 MG/DL (ref 0.6–1.3)
GLUCOSE SERPL-MCNC: 71 MG/DL (ref 74–99)
HCT VFR BLD AUTO: 30.8 % (ref 35–45)
HGB BLD-MCNC: 8.7 G/DL (ref 12–16)
POTASSIUM SERPL-SCNC: 4.5 MMOL/L (ref 3.5–5.5)
SODIUM SERPL-SCNC: 142 MMOL/L (ref 136–145)
SPECIMEN EXP DATE BLD: NORMAL

## 2020-12-31 PROCEDURE — 87635 SARS-COV-2 COVID-19 AMP PRB: CPT

## 2020-12-31 PROCEDURE — 85018 HEMOGLOBIN: CPT

## 2020-12-31 PROCEDURE — 86901 BLOOD TYPING SEROLOGIC RH(D): CPT

## 2020-12-31 PROCEDURE — 93005 ELECTROCARDIOGRAM TRACING: CPT

## 2020-12-31 PROCEDURE — 80048 BASIC METABOLIC PNL TOTAL CA: CPT

## 2021-01-01 LAB
ATRIAL RATE: 68 BPM
CALCULATED P AXIS, ECG09: 64 DEGREES
CALCULATED R AXIS, ECG10: 75 DEGREES
CALCULATED T AXIS, ECG11: 58 DEGREES
DIAGNOSIS, 93000: NORMAL
P-R INTERVAL, ECG05: 134 MS
Q-T INTERVAL, ECG07: 384 MS
QRS DURATION, ECG06: 84 MS
QTC CALCULATION (BEZET), ECG08: 408 MS
SARS-COV-2, COV2NT: NOT DETECTED
VENTRICULAR RATE, ECG03: 68 BPM

## 2021-08-03 PROBLEM — E66.01 MORBID OBESITY (HCC): Status: RESOLVED | Noted: 2021-08-03 | Resolved: 2021-08-03

## 2021-12-14 ENCOUNTER — DOCUMENTATION ONLY (OUTPATIENT)
Dept: SURGERY | Age: 46
End: 2021-12-14

## 2021-12-14 NOTE — PROGRESS NOTES
Per [facility] requirements;  E-mail and letter sent for follow up appointment. [employer] [facility]  [address]   [address], [address] [facility]                 [employer] [facility] [place]  [facility]  [facility]      Dear Patient,    Your health is our main concern. It is important for your health to have follow-up lab work and to see your surgeon at 2 months, 4 months, 6 months, 9 months and annually after your weight loss surgery. Additionally, the Department of Bariatric Surgery at our hospital is a member of the Metabolic and Bariatric Surgery Accreditation and Quality Improvement Program [facility]). As a participant in this program, we gather information on the outcomes of our patients after surgery. Please call the office for a follow up appointment at [phone]. If you have moved out of the area or have changed surgeons please call us and let us know the name of your doctor. Your health and feedback are important to us. We greatly appreciate your response.        Thank you,  [employer] [facility] [address] [facility]

## 2022-03-19 PROBLEM — E55.9 HYPOVITAMINOSIS D: Status: ACTIVE | Noted: 2020-07-29

## 2022-12-08 ENCOUNTER — DOCUMENTATION ONLY (OUTPATIENT)
Dept: SURGERY | Age: 47
End: 2022-12-08

## 2022-12-08 NOTE — PROGRESS NOTES
Per [facility] requirements;  E-mail and letter sent for follow up appointment. [employer] [facility]  [address]   [address], [address] [facility] [place]  [facility]  [facility]      Dear Patient,    Your health is our main concern. It is important for your health to have follow-up lab work and to see your surgeon at 3 months, 6 months, 9 months and annually after your weight loss surgery. Additionally, the Department of Bariatric Surgery at our hospital is a member of the Metabolic and Bariatric Surgery Accreditation and Quality Improvement Program [facility]). As a participant in this program, we gather information on the outcomes of our patients after surgery. Please call the office for a follow up appointment at [phone]. If you have moved out of the area or have changed surgeons please call us and let us know the name of your doctor. Your health and feedback are important to us. We greatly appreciate your response.        Thank you,  [employer] [facility] [address] [facility]

## 2023-07-27 ENCOUNTER — TRANSCRIBE ORDERS (OUTPATIENT)
Facility: HOSPITAL | Age: 48
End: 2023-07-27

## 2023-07-27 DIAGNOSIS — M79.605 LEFT LEG PAIN: Primary | ICD-10-CM

## (undated) DEVICE — CATH IV AUTOGRD ORN 14GA 45MM -- INSYTE-N

## (undated) DEVICE — TROCAR LAP L100MM DIA5MM BLDELSS W/ STBL SL ENDOPATH XCEL

## (undated) DEVICE — SOL IRRIGATION INJ NACL 0.9% 500ML BTL

## (undated) DEVICE — STERILE POLYISOPRENE POWDER-FREE SURGICAL GLOVES: Brand: PROTEXIS

## (undated) DEVICE — TROCAR ENDOSCP L100MM DIA12MM STBL SL BLDELSS ENDOPATH XCEL

## (undated) DEVICE — REM POLYHESIVE ADULT PATIENT RETURN ELECTRODE: Brand: VALLEYLAB

## (undated) DEVICE — SYR LR LCK 1ML GRAD NSAF 30ML --

## (undated) DEVICE — GARMENT,MEDLINE,DVT,INT,CALF,MED, GEN2: Brand: MEDLINE

## (undated) DEVICE — STRAP,POSITIONING,KNEE/BODY,FOAM,4X60": Brand: MEDLINE

## (undated) DEVICE — CATHETER CHOLGM 4.5FR L18IN W/ MTL SUPP TB

## (undated) DEVICE — GOWN ISOLATN REG BLU POLY UNISX W/ THMB LOOP

## (undated) DEVICE — Device: Brand: MEDEX

## (undated) DEVICE — ENDOCUT SCISSOR TIP, DISPOSABLE: Brand: RENEW

## (undated) DEVICE — APPLIER CLP M/L SHFT DIA5MM 15 LIG LIGAMAX 5

## (undated) DEVICE — PREP SKN PREVAIL 40ML APPL --

## (undated) DEVICE — 3M™ STERI-STRIP™ REINFORCED ADHESIVE SKIN CLOSURES, R1548, 1 IN X 5 IN (25 MM X 125 MM), 4 STRIPS/ENVELOPE: Brand: 3M™ STERI-STRIP™

## (undated) DEVICE — DRAPE XR C ARM 41X74IN LF --

## (undated) DEVICE — MAJ-1414 SINGLE USE ADPATER BIOPSY VALV: Brand: SINGLE USE ADAPTOR BIOPSY VALVE

## (undated) DEVICE — (D)PACK STD BSHR BARIATRIC -- DISC BY MFR USE ITEM 338832

## (undated) DEVICE — SUT MONOCRYL PLUS UD 4-0 --

## (undated) DEVICE — VISUALIZATION SYSTEM: Brand: CLEARIFY

## (undated) DEVICE — NEEDLE INSUF L150MM DIA2MM DISP FOR PNEUMOPERI ENDOPATH

## (undated) DEVICE — DEVON™ KNEE AND BODY STRAP 60" X 3" (1.5 M X 7.6 CM): Brand: DEVON

## (undated) DEVICE — KENDALL SCD EXPRESS SLEEVES, KNEE LENGTH, MEDIUM: Brand: KENDALL SCD

## (undated) DEVICE — TISSUE RETRIEVAL SYSTEM: Brand: INZII RETRIEVAL SYSTEM

## (undated) DEVICE — GAUZE,SPONGE,2"X2",8PLY,STERILE,LF,2'S: Brand: MEDLINE